# Patient Record
Sex: FEMALE | Race: WHITE | NOT HISPANIC OR LATINO | Employment: FULL TIME | URBAN - METROPOLITAN AREA
[De-identification: names, ages, dates, MRNs, and addresses within clinical notes are randomized per-mention and may not be internally consistent; named-entity substitution may affect disease eponyms.]

---

## 2023-03-13 ENCOUNTER — TELEPHONE (OUTPATIENT)
Dept: PRIMARY CARE | Facility: CLINIC | Age: 43
End: 2023-03-13
Payer: COMMERCIAL

## 2023-03-13 DIAGNOSIS — R21 RASH: Primary | ICD-10-CM

## 2023-03-14 RX ORDER — METHYLPREDNISOLONE 4 MG/1
TABLET ORAL
Qty: 21 TABLET | Refills: 0 | Status: SHIPPED | OUTPATIENT
Start: 2023-03-14 | End: 2023-03-21

## 2023-03-22 ENCOUNTER — TELEPHONE (OUTPATIENT)
Dept: PRIMARY CARE | Facility: CLINIC | Age: 43
End: 2023-03-22
Payer: COMMERCIAL

## 2023-03-23 PROBLEM — M54.9 BACK PAIN: Status: ACTIVE | Noted: 2023-03-23

## 2023-03-23 PROBLEM — E03.9 HYPOTHYROIDISM: Status: ACTIVE | Noted: 2023-03-23

## 2023-03-23 PROBLEM — L71.9 ROSACEA: Status: ACTIVE | Noted: 2023-03-23

## 2023-03-23 PROBLEM — K21.9 GERD (GASTROESOPHAGEAL REFLUX DISEASE): Status: ACTIVE | Noted: 2023-03-23

## 2023-03-23 PROBLEM — M22.40 CHONDROMALACIA OF PATELLA: Status: ACTIVE | Noted: 2023-03-23

## 2023-03-23 PROBLEM — F33.42 RECURRENT MAJOR DEPRESSIVE DISORDER, IN FULL REMISSION (CMS-HCC): Status: ACTIVE | Noted: 2023-03-23

## 2023-03-23 PROBLEM — U07.1 COVID-19 VIRUS INFECTION: Status: ACTIVE | Noted: 2023-03-23

## 2023-03-23 PROBLEM — F41.1 GAD (GENERALIZED ANXIETY DISORDER): Status: ACTIVE | Noted: 2023-03-23

## 2023-03-23 RX ORDER — VIT C/E/ZN/COPPR/LUTEIN/ZEAXAN 250MG-90MG
CAPSULE ORAL
COMMUNITY

## 2023-03-23 RX ORDER — LEVOTHYROXINE SODIUM 75 UG/1
37.5 TABLET ORAL DAILY
COMMUNITY
Start: 2015-10-13 | End: 2023-06-08

## 2023-03-23 RX ORDER — LEVONORGESTREL 52 MG/1
INTRAUTERINE DEVICE INTRAUTERINE
COMMUNITY
End: 2024-04-27 | Stop reason: ALTCHOICE

## 2023-03-23 RX ORDER — SERTRALINE HYDROCHLORIDE 150 MG/1
150 CAPSULE ORAL DAILY
COMMUNITY
Start: 2020-03-31 | End: 2023-06-26 | Stop reason: DRUGHIGH

## 2023-03-23 RX ORDER — MULTIVITAMIN
TABLET ORAL
COMMUNITY

## 2023-03-24 ENCOUNTER — OFFICE VISIT (OUTPATIENT)
Dept: PRIMARY CARE | Facility: CLINIC | Age: 43
End: 2023-03-24
Payer: COMMERCIAL

## 2023-03-24 VITALS
WEIGHT: 216.6 LBS | SYSTOLIC BLOOD PRESSURE: 102 MMHG | HEART RATE: 72 BPM | BODY MASS INDEX: 32.93 KG/M2 | DIASTOLIC BLOOD PRESSURE: 60 MMHG | OXYGEN SATURATION: 99 %

## 2023-03-24 DIAGNOSIS — E03.9 HYPOTHYROIDISM, UNSPECIFIED TYPE: ICD-10-CM

## 2023-03-24 DIAGNOSIS — Z13.29 THYROID DISORDER SCREENING: ICD-10-CM

## 2023-03-24 DIAGNOSIS — R21 ERYTHEMATOUS RASH: Primary | ICD-10-CM

## 2023-03-24 DIAGNOSIS — L71.9 ACNE ROSACEA: ICD-10-CM

## 2023-03-24 PROCEDURE — 99213 OFFICE O/P EST LOW 20 MIN: CPT | Performed by: INTERNAL MEDICINE

## 2023-03-24 PROCEDURE — 1036F TOBACCO NON-USER: CPT | Performed by: INTERNAL MEDICINE

## 2023-03-24 RX ORDER — HYDROXYZINE HYDROCHLORIDE 10 MG/1
10 TABLET, FILM COATED ORAL 4 TIMES DAILY
Qty: 30 TABLET | Refills: 0 | Status: SHIPPED | OUTPATIENT
Start: 2023-03-24 | End: 2023-06-26 | Stop reason: ALTCHOICE

## 2023-03-24 ASSESSMENT — ENCOUNTER SYMPTOMS
FATIGUE: 0
ROS SKIN COMMENTS: NO PHOTOSENSITIVITY
FEVER: 0

## 2023-03-24 ASSESSMENT — PATIENT HEALTH QUESTIONNAIRE - PHQ9
SUM OF ALL RESPONSES TO PHQ9 QUESTIONS 1 AND 2: 0
2. FEELING DOWN, DEPRESSED OR HOPELESS: NOT AT ALL
1. LITTLE INTEREST OR PLEASURE IN DOING THINGS: NOT AT ALL

## 2023-03-24 NOTE — PROGRESS NOTES
Patient ID: Iman Wooten is a 42 y.o. female who presents for Rash (Around her neck since February, steroids did not help).    /60   Pulse 72   Wt 98.2 kg (216 lb 9.6 oz)   SpO2 99%   BMI 32.93 kg/m²     Rash  This is a chronic problem. Episode onset: more than 2wks. The problem has been waxing and waning since onset. The affected locations include the neck and chest (upper part of the chest). The rash is characterized by itchiness and redness. It is unknown if there was an exposure to a precipitant. Pertinent negatives include no facial edema, fatigue, fever or joint pain. Past treatments include oral steroids. The treatment provided mild relief. There is no history of allergies, asthma or eczema.       Subjective     Review of Systems   Constitutional:  Negative for fatigue and fever.   Musculoskeletal:  Negative for joint pain.   Skin:  Positive for rash.        No photosensitivity       Objective     Physical Exam  Vitals and nursing note reviewed.   Constitutional:       Appearance: Normal appearance.   Cardiovascular:      Rate and Rhythm: Normal rate and regular rhythm.      Pulses: Normal pulses.      Heart sounds: Normal heart sounds.   Pulmonary:      Effort: Pulmonary effort is normal.      Breath sounds: Normal breath sounds.   Skin:     Findings: Erythema and rash present.      Comments: Acneiform eruption    Neurological:      Mental Status: She is alert.         Lab Results   Component Value Date    WBC 6.0 05/28/2022    HGB 15.0 05/28/2022    HCT 44.6 05/28/2022    MCV 93 05/28/2022     05/28/2022           Problem List Items Addressed This Visit          Endocrine/Metabolic    Hypothyroidism     Other Visit Diagnoses       Erythematous rash    -  Primary    Relevant Medications    hydrOXYzine HCL (Atarax) 10 mg tablet    Other Relevant Orders    CBC and Auto Differential    KRISTEN    Sedimentation Rate    C-reactive protein    Tsh With Reflex To Free T4 If Abnormal    Thyroid  Peroxidase (TPO) Antibody    Thyroid disorder screening        Relevant Orders    Tsh With Reflex To Free T4 If Abnormal    Acne rosacea                  Erythematous acneform eruption on the neck and upper part of the chest  No photosensitivity no known inciting factors or triggers  Appears to be erythematous acneform eruption  Etiology unclear  We will do CBCs, sed rate, CRP, KRISTEN,  Hydroxyzine 10 mg 1 pill 4 times a day as needed for itching and rash  If not better follow-up with the PCP

## 2023-05-27 LAB
ALANINE AMINOTRANSFERASE (SGPT) (U/L) IN SER/PLAS: 14 U/L (ref 7–45)
ALBUMIN (G/DL) IN SER/PLAS: 4.5 G/DL (ref 3.4–5)
ALKALINE PHOSPHATASE (U/L) IN SER/PLAS: 43 U/L (ref 33–110)
ANION GAP IN SER/PLAS: 13 MMOL/L (ref 10–20)
ASPARTATE AMINOTRANSFERASE (SGOT) (U/L) IN SER/PLAS: 17 U/L (ref 9–39)
BILIRUBIN TOTAL (MG/DL) IN SER/PLAS: 0.9 MG/DL (ref 0–1.2)
CALCIDIOL (25 OH VITAMIN D3) (NG/ML) IN SER/PLAS: 31 NG/ML
CALCIUM (MG/DL) IN SER/PLAS: 10 MG/DL (ref 8.6–10.6)
CARBON DIOXIDE, TOTAL (MMOL/L) IN SER/PLAS: 25 MMOL/L (ref 21–32)
CHLORIDE (MMOL/L) IN SER/PLAS: 107 MMOL/L (ref 98–107)
CHOLESTEROL (MG/DL) IN SER/PLAS: 181 MG/DL (ref 0–199)
CHOLESTEROL IN HDL (MG/DL) IN SER/PLAS: 63.9 MG/DL
CHOLESTEROL/HDL RATIO: 2.8
CREATININE (MG/DL) IN SER/PLAS: 0.79 MG/DL (ref 0.5–1.05)
ERYTHROCYTE DISTRIBUTION WIDTH (RATIO) BY AUTOMATED COUNT: 11.5 % (ref 11.5–14.5)
ERYTHROCYTE MEAN CORPUSCULAR HEMOGLOBIN CONCENTRATION (G/DL) BY AUTOMATED: 32 G/DL (ref 32–36)
ERYTHROCYTE MEAN CORPUSCULAR VOLUME (FL) BY AUTOMATED COUNT: 96 FL (ref 80–100)
ERYTHROCYTES (10*6/UL) IN BLOOD BY AUTOMATED COUNT: 4.9 X10E12/L (ref 4–5.2)
GFR FEMALE: >90 ML/MIN/1.73M2
GLUCOSE (MG/DL) IN SER/PLAS: 85 MG/DL (ref 74–99)
HEMATOCRIT (%) IN BLOOD BY AUTOMATED COUNT: 46.9 % (ref 36–46)
HEMOGLOBIN (G/DL) IN BLOOD: 15 G/DL (ref 12–16)
LDL: 98 MG/DL (ref 0–99)
LEUKOCYTES (10*3/UL) IN BLOOD BY AUTOMATED COUNT: 6.6 X10E9/L (ref 4.4–11.3)
NRBC (PER 100 WBCS) BY AUTOMATED COUNT: 0 /100 WBC (ref 0–0)
PLATELETS (10*3/UL) IN BLOOD AUTOMATED COUNT: 334 X10E9/L (ref 150–450)
POTASSIUM (MMOL/L) IN SER/PLAS: 4.4 MMOL/L (ref 3.5–5.3)
PROTEIN TOTAL: 7.2 G/DL (ref 6.4–8.2)
SODIUM (MMOL/L) IN SER/PLAS: 141 MMOL/L (ref 136–145)
THYROTROPIN (MIU/L) IN SER/PLAS BY DETECTION LIMIT <= 0.05 MIU/L: 2.24 MIU/L (ref 0.44–3.98)
TRIGLYCERIDE (MG/DL) IN SER/PLAS: 98 MG/DL (ref 0–149)
UREA NITROGEN (MG/DL) IN SER/PLAS: 11 MG/DL (ref 6–23)
VLDL: 20 MG/DL (ref 0–40)

## 2023-06-02 ENCOUNTER — APPOINTMENT (OUTPATIENT)
Dept: PRIMARY CARE | Facility: CLINIC | Age: 43
End: 2023-06-02
Payer: COMMERCIAL

## 2023-06-08 DIAGNOSIS — E03.9 HYPOTHYROIDISM, UNSPECIFIED TYPE: ICD-10-CM

## 2023-06-08 RX ORDER — LEVOTHYROXINE SODIUM 75 UG/1
37.5 TABLET ORAL DAILY
Qty: 15 TABLET | Refills: 0 | Status: SHIPPED | OUTPATIENT
Start: 2023-06-08 | End: 2023-06-26 | Stop reason: SDUPTHER

## 2023-06-26 ENCOUNTER — OFFICE VISIT (OUTPATIENT)
Dept: PRIMARY CARE | Facility: CLINIC | Age: 43
End: 2023-06-26
Payer: COMMERCIAL

## 2023-06-26 VITALS
DIASTOLIC BLOOD PRESSURE: 71 MMHG | SYSTOLIC BLOOD PRESSURE: 107 MMHG | HEIGHT: 68 IN | BODY MASS INDEX: 32.89 KG/M2 | HEART RATE: 69 BPM | WEIGHT: 217 LBS | OXYGEN SATURATION: 97 %

## 2023-06-26 DIAGNOSIS — E03.9 HYPOTHYROIDISM, UNSPECIFIED TYPE: ICD-10-CM

## 2023-06-26 DIAGNOSIS — F33.42 RECURRENT MAJOR DEPRESSIVE DISORDER, IN FULL REMISSION (CMS-HCC): ICD-10-CM

## 2023-06-26 DIAGNOSIS — Z00.00 ANNUAL PHYSICAL EXAM: Primary | ICD-10-CM

## 2023-06-26 DIAGNOSIS — F41.1 GAD (GENERALIZED ANXIETY DISORDER): ICD-10-CM

## 2023-06-26 PROBLEM — U07.1 COVID-19 VIRUS INFECTION: Status: RESOLVED | Noted: 2023-03-23 | Resolved: 2023-06-26

## 2023-06-26 PROCEDURE — 1036F TOBACCO NON-USER: CPT | Performed by: FAMILY MEDICINE

## 2023-06-26 PROCEDURE — 99396 PREV VISIT EST AGE 40-64: CPT | Performed by: FAMILY MEDICINE

## 2023-06-26 RX ORDER — LEVOTHYROXINE SODIUM 75 UG/1
37.5 TABLET ORAL
Qty: 45 TABLET | Refills: 3 | Status: SHIPPED | OUTPATIENT
Start: 2023-06-26 | End: 2024-06-25

## 2023-06-26 RX ORDER — SERTRALINE HYDROCHLORIDE 100 MG/1
150 TABLET, FILM COATED ORAL DAILY
Qty: 135 TABLET | Refills: 3 | Status: SHIPPED | OUTPATIENT
Start: 2023-06-26 | End: 2023-12-23 | Stop reason: SDUPTHER

## 2023-06-26 RX ORDER — ESTRADIOL 1 MG/1
1 TABLET ORAL DAILY
COMMUNITY
Start: 2023-05-19 | End: 2024-05-22

## 2023-06-26 ASSESSMENT — PATIENT HEALTH QUESTIONNAIRE - PHQ9
2. FEELING DOWN, DEPRESSED OR HOPELESS: NOT AT ALL
1. LITTLE INTEREST OR PLEASURE IN DOING THINGS: NOT AT ALL
SUM OF ALL RESPONSES TO PHQ9 QUESTIONS 1 AND 2: 0

## 2023-06-26 ASSESSMENT — COLUMBIA-SUICIDE SEVERITY RATING SCALE - C-SSRS
6. HAVE YOU EVER DONE ANYTHING, STARTED TO DO ANYTHING, OR PREPARED TO DO ANYTHING TO END YOUR LIFE?: NO
2. HAVE YOU ACTUALLY HAD ANY THOUGHTS OF KILLING YOURSELF?: NO
1. IN THE PAST MONTH, HAVE YOU WISHED YOU WERE DEAD OR WISHED YOU COULD GO TO SLEEP AND NOT WAKE UP?: NO

## 2023-06-26 ASSESSMENT — ENCOUNTER SYMPTOMS
LOSS OF SENSATION IN FEET: 0
OCCASIONAL FEELINGS OF UNSTEADINESS: 0
DEPRESSION: 0

## 2023-06-26 NOTE — PROGRESS NOTES
"Subjective   Patient ID: Iman Wooten is a 42 y.o. female who presents for Annual Exam.    Last Annual Physical: June 2022  Last Dental Visit: Up-to-date  Last Eye exam: Within the year  Hearing Concerns: No  Diet: Well- balanced  Exercise Routine: Regular exercise      HPI   The patient reports that she is taking levothyroxine for her hypothyroidism, Sertraline for her anxiety and she has a Mirena IUD and is taking Estradiol for her menopausal symptoms. She is taking these medications as prescribed and is tolerating them well. She complains of consistent itching in her neck area and adds that the hydroxyzine did not help her symptoms.     Review of Systems  Constitutional: No fever or chills, No Night Sweats  Eyes: No Blurry Vision or Eye sight problems  ENT: No Nasal Discharge, Hoarseness, sore throat  Cardiovascular: no chest pain, no palpitations and no syncope.   Respiratory: no cough, no shortness of breath during exertion and no shortness of breath at rest.   Gastrointestinal: no abdominal pain, no nausea and no vomiting.   : No vaginal discharge, burning with urination, no blood in urine or stools  Skin: No Skin rashes or Lesions  Neuro: No Headache, no dizziness or Numbness or tingling  Psych: No Anxiety, depression or sleeping problems  Heme: No Easy bleeding or brusing.     Objective   /71   Pulse 69   Ht 1.727 m (5' 8\")   Wt 98.4 kg (217 lb)   SpO2 97%   BMI 32.99 kg/m²     Physical Exam  Patient declined Chaperone  Constitutional: Alert and in no acute distress. Well developed, well nourished.   Head and Face: Head and face: Normal.    Eyes: Normal external exam. Pupils were equal in size, round, reactive to light (PERRL) with normal accommodation and extraocular movements intact (EOMI).   Ears, Nose, Mouth, and Throat: External inspection of ears and nose: Normal.  Hearing: Normal.  Nasal mucosa, septum, and turbinates: Normal.  Lips, teeth, and gums: Normal.  Oropharynx: Normal. "   Neck: No neck mass was observed. Supple. Thyroid not enlarged and there were no palpable thyroid nodules.   Cardiovascular: Heart rate and rhythm were normal, normal S1 and S2. Pedal pulses: Normal. No peripheral edema.   Pulmonary: No respiratory distress. Clear bilateral breath sounds.   Abdomen: Soft nontender; no abdominal mass palpated. Normal bowel sounds. No organomegaly.   Musculoskeletal: No joint swelling seen, normal movements of all extremities. Range of motion: Normal.  Muscle strength/tone: Normal.    Skin: Normal skin color and pigmentation, normal skin turgor, and no rash.   Neurologic: Deep tendon reflexes were 2+ and symmetric.   Psychiatric: Judgment and insight: Intact. Mood and affect: Normal.  Lymphatic: No cervical lymphadenopathy. Palpation of lymph nodes in axillae: Normal.  Palpation of lymph nodes in groin: Normal.    Lab Results   Component Value Date    WBC 6.6 05/27/2023    HGB 15.0 05/27/2023    HCT 46.9 (H) 05/27/2023     05/27/2023    CHOL 181 05/27/2023    TRIG 98 05/27/2023    HDL 63.9 05/27/2023    ALT 14 05/27/2023    AST 17 05/27/2023     05/27/2023    K 4.4 05/27/2023     05/27/2023    CREATININE 0.79 05/27/2023    BUN 11 05/27/2023    CO2 25 05/27/2023    TSH 2.24 05/27/2023           Assessment/Plan   Diagnoses and all orders for this visit:  Annual physical exam  Hypothyroidism, unspecified type  -     levothyroxine (Synthroid, Levoxyl) 75 mcg tablet; Take 0.5 tablets (37.5 mcg) by mouth once daily in the morning. Take before meals.  HERO (generalized anxiety disorder)  -     sertraline (Zoloft) 100 mg tablet; Take 1.5 tablets (150 mg) by mouth once daily.  Recurrent major depressive disorder, in full remission (CMS/[facility])        Dear Iman Wooten     It was my pleasure to take care of you today in the office. Below are the things we discussed today:    1. 1. Immunizations: Yearly Flu shot is recommended. Up-to-date         a: COVID: Up-to-Date          b: Tetanus: Up-to-date    2. Blood Work: Reviewed   3. Seen your dentist twice a year  4. Yearly Eye exam is recommended    5. BMI: Obese   6: Diet recommendations:   Eat Clean, Try to have as many home cooked meals as possible  Avoid processed foods which contain excess calories, sugar, and sodium.    7. Exercise recommendations:   150 minutes a week to maintain your weight     If you have to loose weight, you need a better diet and exercise plan.     8. Supplements recommended:  a - Calcium 600 mg up to twice a day to get a total of 1200 mg. Each 8 oz of milk or yogurt or 1 oz of cheese, 1 Banana, 1 serving of green Leafy vegetable has about 300 mg of Calcium, so you may subtract that amount. Calcium citrate is the only acceptable supplement to take if you take an acid suppressing medication like Prilosec; otherwise Calcium carbonate is acceptable too (It can cause Constipation).   b - Vitamin D - 2000 IU daily     9. Please get your Living will / Advance directive completed if you do not have one already. Please make sure our office has a copy of the latest one.     10. Mammogram: Uptodate. Patient is following up with GYN at the clinic   12. PAP: Last done in Dec 2020. Cytology and HPV up-to-date    13. Hypothyroidism: Continue levothyroxine.     14. Anxiety: Continue Sertraline.     15. Menopausal symptoms: The patient has a Mirena IUD and is taking estradiol.     16. Itching: Advised the patient to use cold CeraVe for itching.     17. Weight loss: Advised the patient to work on her diet and exercise and let me know when she would like to start tapering off the Zoloft.     Follow up in one year for a Physical. Please call the office before your Physical to see if you need blood work completed prior to your physical.     Please call me if any questions arise from now until your next visit. I will call you after I am done seeing patients. A Doctor is always available by phone when the office is closed. Please  feel free to call for help with any problem that you feel shouldn't wait until the office re-opens.     Scribe Attestation  By signing my name below, I, Bal Vigil   attest that this documentation has been prepared under the direction and in the presence of Dahlia Ott MD.

## 2023-12-23 DIAGNOSIS — F41.1 GAD (GENERALIZED ANXIETY DISORDER): ICD-10-CM

## 2023-12-23 RX ORDER — SERTRALINE HYDROCHLORIDE 100 MG/1
150 TABLET, FILM COATED ORAL DAILY
Qty: 45 TABLET | Refills: 0 | Status: SHIPPED | OUTPATIENT
Start: 2023-12-23 | End: 2024-01-18

## 2024-01-02 ENCOUNTER — TELEPHONE (OUTPATIENT)
Dept: PRIMARY CARE | Facility: CLINIC | Age: 44
End: 2024-01-02
Payer: COMMERCIAL

## 2024-01-02 DIAGNOSIS — U07.1 COVID-19 VIRUS INFECTION: Primary | ICD-10-CM

## 2024-01-17 DIAGNOSIS — F41.1 GAD (GENERALIZED ANXIETY DISORDER): ICD-10-CM

## 2024-01-18 RX ORDER — SERTRALINE HYDROCHLORIDE 100 MG/1
150 TABLET, FILM COATED ORAL DAILY
Qty: 135 TABLET | Refills: 0 | Status: SHIPPED | OUTPATIENT
Start: 2024-01-18 | End: 2024-03-19

## 2024-03-18 DIAGNOSIS — F41.1 GAD (GENERALIZED ANXIETY DISORDER): ICD-10-CM

## 2024-03-19 RX ORDER — SERTRALINE HYDROCHLORIDE 100 MG/1
150 TABLET, FILM COATED ORAL DAILY
Qty: 135 TABLET | Refills: 0 | Status: SHIPPED | OUTPATIENT
Start: 2024-03-19 | End: 2024-05-08

## 2024-04-10 ENCOUNTER — TELEPHONE (OUTPATIENT)
Dept: PRIMARY CARE | Facility: CLINIC | Age: 44
End: 2024-04-10
Payer: COMMERCIAL

## 2024-04-10 DIAGNOSIS — Z00.00 ROUTINE GENERAL MEDICAL EXAMINATION AT A HEALTH CARE FACILITY: Primary | ICD-10-CM

## 2024-04-10 DIAGNOSIS — E55.9 VITAMIN D DEFICIENCY: ICD-10-CM

## 2024-04-27 DIAGNOSIS — N95.1 MENOPAUSAL SYNDROME ON HORMONE REPLACEMENT THERAPY: Primary | ICD-10-CM

## 2024-04-27 DIAGNOSIS — Z79.890 MENOPAUSAL SYNDROME ON HORMONE REPLACEMENT THERAPY: Primary | ICD-10-CM

## 2024-04-27 RX ORDER — PROGESTERONE 200 MG/1
CAPSULE ORAL
Qty: 14 CAPSULE | Refills: 11 | Status: SHIPPED | OUTPATIENT
Start: 2024-04-27

## 2024-04-27 NOTE — PROGRESS NOTES
Pt accidentally pulled IUD out; will use condoms; MP prescribed, she would like to continue with the estrogen

## 2024-05-08 DIAGNOSIS — F41.1 GAD (GENERALIZED ANXIETY DISORDER): ICD-10-CM

## 2024-05-08 RX ORDER — SERTRALINE HYDROCHLORIDE 100 MG/1
150 TABLET, FILM COATED ORAL DAILY
Qty: 135 TABLET | Refills: 0 | Status: SHIPPED | OUTPATIENT
Start: 2024-05-08

## 2024-06-27 ENCOUNTER — APPOINTMENT (OUTPATIENT)
Dept: PRIMARY CARE | Facility: CLINIC | Age: 44
End: 2024-06-27
Payer: COMMERCIAL

## 2024-06-29 DIAGNOSIS — E03.9 HYPOTHYROIDISM, UNSPECIFIED TYPE: ICD-10-CM

## 2024-06-30 RX ORDER — LEVOTHYROXINE SODIUM 75 UG/1
37.5 TABLET ORAL EVERY MORNING
Qty: 45 TABLET | Refills: 0 | Status: SHIPPED | OUTPATIENT
Start: 2024-06-30

## 2024-07-22 ENCOUNTER — APPOINTMENT (OUTPATIENT)
Dept: OBSTETRICS AND GYNECOLOGY | Facility: CLINIC | Age: 44
End: 2024-07-22
Payer: COMMERCIAL

## 2024-07-22 VITALS
BODY MASS INDEX: 34.92 KG/M2 | HEIGHT: 68 IN | SYSTOLIC BLOOD PRESSURE: 126 MMHG | WEIGHT: 230.4 LBS | DIASTOLIC BLOOD PRESSURE: 68 MMHG

## 2024-07-22 DIAGNOSIS — R63.4 WEIGHT LOSS: ICD-10-CM

## 2024-07-22 DIAGNOSIS — Z12.4 CERVICAL CANCER SCREENING: ICD-10-CM

## 2024-07-22 DIAGNOSIS — Z01.419 WELL WOMAN EXAM WITH ROUTINE GYNECOLOGICAL EXAM: Primary | ICD-10-CM

## 2024-07-22 PROCEDURE — 3008F BODY MASS INDEX DOCD: CPT | Performed by: NURSE PRACTITIONER

## 2024-07-22 PROCEDURE — 99396 PREV VISIT EST AGE 40-64: CPT | Performed by: NURSE PRACTITIONER

## 2024-07-22 PROCEDURE — 88175 CYTOPATH C/V AUTO FLUID REDO: CPT

## 2024-07-22 PROCEDURE — 87624 HPV HI-RISK TYP POOLED RSLT: CPT

## 2024-07-22 PROCEDURE — 1036F TOBACCO NON-USER: CPT | Performed by: NURSE PRACTITIONER

## 2024-07-22 ASSESSMENT — ENCOUNTER SYMPTOMS
CARDIOVASCULAR NEGATIVE: 0
ENDOCRINE NEGATIVE: 0
EYES NEGATIVE: 0
MUSCULOSKELETAL NEGATIVE: 0
PSYCHIATRIC NEGATIVE: 0
ALLERGIC/IMMUNOLOGIC NEGATIVE: 0
CONSTITUTIONAL NEGATIVE: 0
NEUROLOGICAL NEGATIVE: 0
GASTROINTESTINAL NEGATIVE: 0
HEMATOLOGIC/LYMPHATIC NEGATIVE: 0
RESPIRATORY NEGATIVE: 0

## 2024-07-22 ASSESSMENT — PAIN SCALES - GENERAL: PAINLEVEL: 0-NO PAIN

## 2024-07-22 NOTE — PROGRESS NOTES
Subjective   Patient ID: Iman Wooten is a 43 y.o. female who presents for Annual Exam (Pap 2015).  HPI  working with a dietician for weight gain  Previously worked with a provider at the  weight loss clinic, but did not care for it  weight gain despite no change in diet or exercise    Menopausal age: perimenopause      Any Contraindications to HT: personal h/o breast cancer, estrogen sensitive cancer, dementia, stroke, MI, VTE or inherited high risk for VTE, SCAD: none  h/o congenital heart disease (increased risk of DVT) none     contraception: several months ago accidentally pulled out IUD; MP 200mg 14 days/month  Menses: heavy VB for 4 days; monthly   HT: started 2023  use of OTC remedies: none  bioidenticals: none       VMS: yes  Sleep difficulties: occasional   Mood changes: none  Joint pain: mild  Brain fog/difficulty concentrating: yes  Weight gain      GSM: none  are you sexually active: yes  dyspareunia: none  decrease in desire: none  difficulty reaching orgasm: none  Urinary Incontinence: yes, stress, worked with a PFPT       Fractures: none  H/O abnormal pap: none        FH:   breast cancer: maternal grandmother, maternal cousin  ovarian cancer: none  colon cancer: none  pancreatic cancer: none     Social history:  lives with:  and daughter   occupation: works at [employer]  Exercise: yes, jazzercise              Review of Systems    Objective   Physical Exam  Vitals and nursing note reviewed.   Constitutional:       Appearance: Normal appearance.   Cardiovascular:      Rate and Rhythm: Normal rate and regular rhythm.      Heart sounds: Normal heart sounds.   Pulmonary:      Effort: Pulmonary effort is normal.      Breath sounds: Normal breath sounds.   Chest:   Breasts:     Right: Normal.      Left: Normal.   Abdominal:      Tenderness: There is no abdominal tenderness.   Genitourinary:     General: Normal vulva.      Exam position: Lithotomy position.      Labia:         Right: No  lesion.         Left: No lesion.       Vagina: Normal.      Cervix: Normal.   Skin:     General: Skin is warm and dry.   Neurological:      Mental Status: She is alert.   Psychiatric:         Attention and Perception: Attention normal.         Mood and Affect: Mood normal.         Speech: Speech normal.         Behavior: Behavior normal.         Thought Content: Thought content normal.         Cognition and Memory: Cognition and memory normal.         Judgment: Judgment normal.         Assessment/Plan   Diagnoses and all orders for this visit:  Well woman exam with routine gynecological exam  Cervical cancer screening  -     THINPREP PAP TEST  Weight loss  -     Referral to Clinical Pharmacy; Future       Pt plans to return for IUD placement  Samples of CEE .625mg po, lot [id number], 1/2025  If ineffective will switch to transdermal estradiol    PREMA Marinelli-CNP 07/22/24 10:57 AM

## 2024-07-29 NOTE — PROGRESS NOTES
"  Patient ID: Iman Wooten is a 43 y.o. female who presents for No chief complaint on file..    Referring Provider: Francisco Javier Cope  Pt was referred for weight loss     Preferred Pharmacy:    [id number] - [facility], [place] - [address]  [address]  [facility] [place] [address]  Phone: [phone] Fax: [phone]    [facility] - [place], [place] - [address]  [address] [address]  Phone: [phone] Fax: [phone]    [id number] - [place], [place] - [address]  [address] [address]  Phone: [phone] Fax: [phone]      Subjective    Accidentally removed IUD a \"couple of months ago\" - started progesterone while out, getting a new one placed tomorrow.     While IUD was in, no bleeding.   When IUD came out, returned to having a cycle following 14 days of progesterone.    -Heavy with clots, cramps    Any plans for a pregnancy in the next year: No    Uterus: Yes    Movement:   Joint pain? No- has been on estradiol x ~1 year  Strength training? Jazzercise - weights component to each hour long class, 40% cardio and 20% free weights.   Recommended listening to podcast with Alina White  Recommend 2-3x/week for 20 minutes  Osteopenia or osteoporosis: No    Stress Level (rate 1-10): 7-8 out of 10   9 yo daughter with Autism   Exercise is a stress reliever  Started with [facility] therapist - daughter + weight & body image  Recommend daily 2-5 minutes of mindfulness, meditation, journaling, etc to help reduce cortisol levels.     Depression? Yes, life long on sertraline    Sleep:   Quantity/Quality/Regularity? \"Not great\" - 5-6 hours nightly, wears fit bit sleep quality is \"fair\".   Daytime brain fog/memory troubles? Yes- estradiol helped, but still noticeable.   Tobacco? No  Alcohol? Yes. 1 glass of wine every couple day  Recommended reducing alcohol consumption  THC? No    Patient identified goals:       Onset:   3-4 years ago " "(COVID work from home)  Nothing else had changed, eating/moving the same, exercising more.   Overall weight gain about 50 lbs  Noticing ankles hurting more during jazzercise. 3-4 days/week in studio and 1-2 mornings at home.     Current challenges:   Mental - has been on and off diets entire life  Most consistent weight was 180  Adverse to tracking  Time  Feeling short on space for meal prep/planning    How long implementing diet/lifestyle change for weight loss:   Lifelong    Disordered eating patterns:   None    Weight Loss Drug Therapy Options Screening:     GLP-1 and Metformin:     Pre-Diabetes/Diabetes? No  No results found for: \"INSULFAST\", \"GLUF\", \"ZX8DELIO\", \"HGBA1C\", \"LEPTIN\"    Pertinent PMH Review:   PMH of Pancreatitis: No  PMH of Gallbladder disease: No  PMH of Delayed Gastric Emptying: No  GI issues? GERD  Frequency of BM? Daily  PMH of MTC: No  PMH of Retinopathy: No  PMH of Suicidal Ideation: No    Topiramate:   Migraines? No    Adipex:   Anxiety? Yes    Thyroid health:   Lab Results   Component Value Date    TSH 2.24 05/27/2023        HRT  -Estradiol 1mg oral and IUD    Medications potentially contributing to weight gain:   None    Medications tried/stopped for weight management:    None     Concurrent Chronic Medical Conditions:   Cardiovascular Health  The 10-year ASCVD risk score (David PAYAN, et al., 2019) is: 0.5%    Values used to calculate the score:      Age: 43 years      Sex: Female      Is Non- : No      Diabetic: No      Tobacco smoker: No      Systolic Blood Pressure: 126 mmHg      Is BP treated: No      HDL Cholesterol: 63.9 mg/dL      Total Cholesterol: 181 mg/dL    Lab Results   Component Value Date    CHOL 181 05/27/2023     Lab Results   Component Value Date    HDL 63.9 05/27/2023     No results found for: \"LDLCALC\"  Lab Results   Component Value Date    TRIG 98 05/27/2023     No components found for: \"CHOLHDL\"   BP Readings from Last 3 Encounters:   07/22/24 " "126/68   06/26/23 107/71   06/16/23 117/80      Blood Sugar Balance  Lab Results   Component Value Date    GLUCOSE 85 05/27/2023     No results found for: \"LEPTIN\", \"INSULFAST\", \"GLUF\"      Thyroid  Lab Results   Component Value Date    TSH 2.24 05/27/2023     Iron Status  No results found for: \"IRON\", \"TIBC\", \"FERRITIN\"     Kidney Function  Lab Results   Component Value Date    GFRF >90 05/27/2023    CREATININE 0.79 05/27/2023       Potassium  Lab Results   Component Value Date    K 4.4 05/27/2023        Vitamin D3  Lab Results   Component Value Date    VITD25 31 05/27/2023   Recommended 2000 international units  daily, with a fat containing meal    Current Outpatient Medications on File Prior to Visit   Medication Sig Dispense Refill    cholecalciferol (Vitamin D-3) 25 MCG (1000 UT) capsule Take by mouth.      estradiol (Estrace) 1 mg tablet Take 1 tablet (1 mg) by mouth once daily. 90 tablet 3    levothyroxine (Synthroid, Levoxyl) 75 mcg tablet Take 0.5 tablets (37.5 mcg) by mouth once daily in the morning. Take on empty stomach 45 tablet 0    multivitamin tablet Take by mouth.      progesterone (Prometrium) 200 mg capsule Take one pill by mouth at bedtime for 14 days/month 14 capsule 11    sertraline (Zoloft) 100 mg tablet Take 1.5 tablets (150 mg) by mouth once daily. 135 tablet 0     No current facility-administered medications on file prior to visit.        Lifestyle and Nourishment Recommendations  Currently 3 meals daily with a mid morning and mid afternoon snack, sometimes after dinner snack.   Dinner at 8pm on gym days  Not gym days: 6:30pm  Bedtime: 9:30-10:30pm  Ideally last food 3 hours prior to bedtime.   Encouraged 1/2 of plate colorful vegetables at each meal  Focus on whole foods as close to nature as possible (less than 5 ingredients on the label)  Increase high quality protein to 25-30 grams per meal along with 2-3 x/week resistance training  Discussed [employer] Meats  Patient had questions " "regarding protein shakes, currently drinking [employer] protein shakes, recommended discontinuation. Will send recommendation for Whey Protein powder.   Include daily walking (150 min/week) and mind/body activities (meditation, mindfulness, journaling, etc.) for a minimum of 2-5 minutes daily to reduce cortisol levels.   Increase fiber 25-30 grams daily (work up slowly to avoid GI distress)  Goal: Daily bowel movement  Beverages: Focus on water, organic tea (loose leave or in paper, avoid plastic sachets), or sparkling/mineral water (ex. [place], Emir)   Currently drinking 2-3 8pz cups daily. Last 3-4pm.   No more than 1 cup (8oz) of organic coffee daily prior to noon. May consider organic green tea.   Avoid alcohol   Avoid ALL artificial sweeteners   Prioritize 7-8 hours of restful sleep nightly.   Turn off electronics 1 hour prior to bedtime, no electronics in the bedroom.  Establish a regular sleep/wake time (+/- 30 minutes)     Medication and allergy reconciliation completed     Drug Interactions   No significant drug interactions identified    Assessment/Plan     Assessment/Plan  Patients goals:   \"To be less than 200 lbs\"   Discussed patients history, current medical conditions, medications, as well as current diet and lifestyle in depth.   Patient has been making diet/lifestyle changes for: >10 years  BMI: 35  Concurrent medical conditions: hypothyroidism.     Reviewed the risks vs. benefits of available medications that may help promote weight loss.   GLP-1 Education  GLP-1 medications work by stimulating insulin release from the pancreas, slowing gastric emptying, inhibiting post meal glucagon release, and reducing appetite.   Discussed benefits of GLP-1 including lowering blood sugar, blood pressure, improving lipid profile, improving fatty liver disease, reducing the risk of heart disease and kidney disease, weight loss, and delaying the progression of diabetes-related nephropathy.   40-50% of weight " loss may come from lean muscle mass. It is very important to continue regular exercise including activities that maintain/build muscle mass while taking this medication.   Side effects could include but are not limited to low blood sugar (hypoglycemia), loss of appetite, nausea, vomiting, diarrhea, and delayed gastric emptying.   Encouraged smaller meals and avoiding high fat meals to minimize nausea.   Discussed treating hypoglycemia with 15 grams of carbohydrates (1/2 glass of juice, piece of fruit, 3-4 glucose tablets)  Rare but serious side effects could include but are not limited to gallbladder disease, pancreatitis, medullary thyroid cancer, acute kidney injury, worsening diabetes related retinopathy, gastroparesis, and bowel obstruction.   If you would experience increased depression or suicidal thoughts while taking this medication contact your PCP immediately.     LABS  Fasting insulin-ordered today  Fasting glucose- already ordered by PCP  A1C- already ordered by PCP  TSH - already ordered by PCP    Follow-up: 8/20/24 9am     Time spent with pt: Total length of time 60 (minutes) of the encounter and more than 50% was spent counseling the patient.    Carmina Elizalde, Pharm.D, [facility], [facility]  Clinical Pharmacist  Pharmacy Services  [phone]    Continue all meds under the continuation of care with the referring provider and clinical pharmacy team.    Verbal consent to manage patient's drug therapy was obtained from the patient and/or an individual authorized to act on behalf of a patient. They were informed they may decline to participate or withdraw from participation in pharmacy services at any time.

## 2024-07-30 ENCOUNTER — APPOINTMENT (OUTPATIENT)
Dept: PHARMACY | Facility: HOSPITAL | Age: 44
End: 2024-07-30
Payer: COMMERCIAL

## 2024-07-30 ENCOUNTER — TELEPHONE (OUTPATIENT)
Dept: OBSTETRICS AND GYNECOLOGY | Facility: CLINIC | Age: 44
End: 2024-07-30

## 2024-07-30 DIAGNOSIS — R63.4 WEIGHT LOSS: ICD-10-CM

## 2024-07-30 NOTE — TELEPHONE ENCOUNTER
Attempted to call pt for instruction from Francisco Javier Cope prior to visit.  Pt did not answer, left VM to return call to clinic.

## 2024-07-30 NOTE — TELEPHONE ENCOUNTER
----- Message from Francisco Javier Cope sent at 7/30/2024  1:55 PM EDT -----  Can you please remind her to not take any pain medicine, including motrin, 4 hours before her scheduled appointment tomorrow? Thank you

## 2024-07-31 ENCOUNTER — TELEPHONE (OUTPATIENT)
Dept: OBSTETRICS AND GYNECOLOGY | Facility: CLINIC | Age: 44
End: 2024-07-31

## 2024-07-31 ENCOUNTER — APPOINTMENT (OUTPATIENT)
Dept: OBSTETRICS AND GYNECOLOGY | Facility: CLINIC | Age: 44
End: 2024-07-31
Payer: COMMERCIAL

## 2024-07-31 VITALS
HEIGHT: 68 IN | WEIGHT: 228.6 LBS | DIASTOLIC BLOOD PRESSURE: 70 MMHG | BODY MASS INDEX: 34.65 KG/M2 | SYSTOLIC BLOOD PRESSURE: 118 MMHG

## 2024-07-31 DIAGNOSIS — Z30.430 ENCOUNTER FOR IUD INSERTION: Primary | ICD-10-CM

## 2024-07-31 DIAGNOSIS — N95.1 MENOPAUSAL SYNDROME ON HORMONE REPLACEMENT THERAPY: ICD-10-CM

## 2024-07-31 DIAGNOSIS — Z79.890 MENOPAUSAL SYNDROME ON HORMONE REPLACEMENT THERAPY: ICD-10-CM

## 2024-07-31 PROCEDURE — 58300 INSERT INTRAUTERINE DEVICE: CPT | Performed by: NURSE PRACTITIONER

## 2024-07-31 RX ORDER — ESTRADIOL 1 MG/1
1 TABLET ORAL DAILY
Qty: 30 TABLET | Refills: 11 | Status: SHIPPED | OUTPATIENT
Start: 2024-07-31

## 2024-07-31 ASSESSMENT — ENCOUNTER SYMPTOMS
NEUROLOGICAL NEGATIVE: 0
ALLERGIC/IMMUNOLOGIC NEGATIVE: 0
CARDIOVASCULAR NEGATIVE: 0
GASTROINTESTINAL NEGATIVE: 0
RESPIRATORY NEGATIVE: 0
ENDOCRINE NEGATIVE: 0
CONSTITUTIONAL NEGATIVE: 0
EYES NEGATIVE: 0
MUSCULOSKELETAL NEGATIVE: 0
PSYCHIATRIC NEGATIVE: 0
HEMATOLOGIC/LYMPHATIC NEGATIVE: 0

## 2024-07-31 ASSESSMENT — PAIN SCALES - GENERAL: PAINLEVEL: 0-NO PAIN

## 2024-07-31 NOTE — TELEPHONE ENCOUNTER
Contacted pt and verified name and .  Pt notified per Francisco Javier Cope not to take any pain meds 4 hours before her appt today.  Patient states understanding and has no questions at this time.

## 2024-07-31 NOTE — PROGRESS NOTES
Patient ID: Iman Wooten is a 43 y.o. female.    IUD Insertion    Performed by: HANNA Marinelli  Authorized by: HANNA Marinelli    Procedure: IUD insertion    Consent obtained by patient, parent, or legal power of  - including discussion of procedure risks and benefits, patient questions answered, and patient education provided: yes    Pregnancy risk: reasonably certain the patient is not pregnant    Date/Time of Insertion:  7/31/2024 2:26 PM  Immediately prior to procedure a time out was called: yes    Pelvic exam performed: yes    Speculum placed in vagina: yes    Cervix cleaned and prepped: yes    Tenaculum/Allis/Ring Forceps applied to cervix: yes    Anesthesia used: no    Uterus sound depth (cm):  8  Cervix manually dilated: no    IUD inserted without complications: yes    OSM: levonorgestrel 21 mcg/24 hr (8 yrs) 52 mg  Strings trimmed to (cm):  4  Patient tolerated procedure well: yes    Inserted with ultrasound guidance: no    Transvaginal sono confirmed fundal placement: no    Intended removal date: 8 years

## 2024-08-02 DIAGNOSIS — Z78.0 MENOPAUSE: ICD-10-CM

## 2024-08-02 RX ORDER — ESTRADIOL 1 MG/1
1 TABLET ORAL DAILY
Qty: 90 TABLET | Refills: 4 | OUTPATIENT
Start: 2024-08-02

## 2024-08-05 ENCOUNTER — PATIENT MESSAGE (OUTPATIENT)
Dept: OBSTETRICS AND GYNECOLOGY | Facility: CLINIC | Age: 44
End: 2024-08-05
Payer: COMMERCIAL

## 2024-08-05 DIAGNOSIS — N95.1 MENOPAUSAL SYNDROME ON HORMONE REPLACEMENT THERAPY: ICD-10-CM

## 2024-08-05 DIAGNOSIS — Z79.890 MENOPAUSAL SYNDROME ON HORMONE REPLACEMENT THERAPY: ICD-10-CM

## 2024-08-05 DIAGNOSIS — F41.1 GAD (GENERALIZED ANXIETY DISORDER): ICD-10-CM

## 2024-08-05 RX ORDER — ESTRADIOL 1 MG/1
1 TABLET ORAL DAILY
Qty: 30 TABLET | Refills: 11 | OUTPATIENT
Start: 2024-08-05

## 2024-08-05 RX ORDER — SERTRALINE HYDROCHLORIDE 100 MG/1
150 TABLET, FILM COATED ORAL DAILY
Qty: 135 TABLET | Refills: 0 | Status: SHIPPED | OUTPATIENT
Start: 2024-08-05

## 2024-08-05 RX ORDER — ESTRADIOL 1 MG/1
1 TABLET ORAL DAILY
Qty: 90 TABLET | Refills: 3 | Status: SHIPPED | OUTPATIENT
Start: 2024-08-05

## 2024-08-06 ENCOUNTER — LAB (OUTPATIENT)
Dept: LAB | Facility: LAB | Age: 44
End: 2024-08-06
Payer: COMMERCIAL

## 2024-08-06 DIAGNOSIS — E55.9 VITAMIN D DEFICIENCY: ICD-10-CM

## 2024-08-06 DIAGNOSIS — R63.4 WEIGHT LOSS: ICD-10-CM

## 2024-08-06 DIAGNOSIS — Z00.00 ROUTINE GENERAL MEDICAL EXAMINATION AT A HEALTH CARE FACILITY: ICD-10-CM

## 2024-08-06 LAB
25(OH)D3 SERPL-MCNC: 39 NG/ML (ref 30–100)
ALBUMIN SERPL BCP-MCNC: 4.3 G/DL (ref 3.4–5)
ALP SERPL-CCNC: 37 U/L (ref 33–110)
ALT SERPL W P-5'-P-CCNC: 11 U/L (ref 7–45)
ANION GAP SERPL CALC-SCNC: 13 MMOL/L (ref 10–20)
AST SERPL W P-5'-P-CCNC: 14 U/L (ref 9–39)
BILIRUB SERPL-MCNC: 1.1 MG/DL (ref 0–1.2)
BUN SERPL-MCNC: 15 MG/DL (ref 6–23)
CALCIUM SERPL-MCNC: 9.9 MG/DL (ref 8.6–10.6)
CHLORIDE SERPL-SCNC: 107 MMOL/L (ref 98–107)
CHOLEST SERPL-MCNC: 174 MG/DL (ref 0–199)
CHOLESTEROL/HDL RATIO: 2.6
CO2 SERPL-SCNC: 24 MMOL/L (ref 21–32)
CREAT SERPL-MCNC: 0.8 MG/DL (ref 0.5–1.05)
EGFRCR SERPLBLD CKD-EPI 2021: >90 ML/MIN/1.73M*2
ERYTHROCYTE [DISTWIDTH] IN BLOOD BY AUTOMATED COUNT: 11.7 % (ref 11.5–14.5)
EST. AVERAGE GLUCOSE BLD GHB EST-MCNC: 103 MG/DL
GLUCOSE SERPL-MCNC: 92 MG/DL (ref 74–99)
HBA1C MFR BLD: 5.2 %
HCT VFR BLD AUTO: 45.7 % (ref 36–46)
HDLC SERPL-MCNC: 67.8 MG/DL
HGB BLD-MCNC: 14.9 G/DL (ref 12–16)
INSULIN P FAST SERPL-ACNC: 15 UIU/ML (ref 3–25)
LDLC SERPL CALC-MCNC: 74 MG/DL
MCH RBC QN AUTO: 30.6 PG (ref 26–34)
MCHC RBC AUTO-ENTMCNC: 32.6 G/DL (ref 32–36)
MCV RBC AUTO: 94 FL (ref 80–100)
NON HDL CHOLESTEROL: 106 MG/DL (ref 0–149)
NRBC BLD-RTO: 0 /100 WBCS (ref 0–0)
PLATELET # BLD AUTO: 298 X10*3/UL (ref 150–450)
POTASSIUM SERPL-SCNC: 4.3 MMOL/L (ref 3.5–5.3)
PROT SERPL-MCNC: 6.9 G/DL (ref 6.4–8.2)
RBC # BLD AUTO: 4.87 X10*6/UL (ref 4–5.2)
SODIUM SERPL-SCNC: 140 MMOL/L (ref 136–145)
TRIGL SERPL-MCNC: 163 MG/DL (ref 0–149)
TSH SERPL-ACNC: 3.63 MIU/L (ref 0.44–3.98)
VIT B12 SERPL-MCNC: 428 PG/ML (ref 211–911)
VLDL: 33 MG/DL (ref 0–40)
WBC # BLD AUTO: 7.6 X10*3/UL (ref 4.4–11.3)

## 2024-08-06 PROCEDURE — 80061 LIPID PANEL: CPT

## 2024-08-06 PROCEDURE — 80053 COMPREHEN METABOLIC PANEL: CPT

## 2024-08-06 PROCEDURE — 36415 COLL VENOUS BLD VENIPUNCTURE: CPT

## 2024-08-06 PROCEDURE — 85027 COMPLETE CBC AUTOMATED: CPT

## 2024-08-06 PROCEDURE — 82607 VITAMIN B-12: CPT

## 2024-08-06 PROCEDURE — 84443 ASSAY THYROID STIM HORMONE: CPT

## 2024-08-06 PROCEDURE — 83036 HEMOGLOBIN GLYCOSYLATED A1C: CPT

## 2024-08-06 PROCEDURE — 83525 ASSAY OF INSULIN: CPT

## 2024-08-06 PROCEDURE — 82306 VITAMIN D 25 HYDROXY: CPT

## 2024-08-19 NOTE — PROGRESS NOTES
"  Patient ID: Iman Wooten is a 43 y.o. female who presents for No chief complaint on file..    Referring Provider: Francisco Javier Cope  Pt was referred for weight loss     Preferred Pharmacy:    [facility]/pharmacy #[id number] - [facility], [place] - [address]  [address]  [facility] [address]  Phone: [phone] Fax: [phone]    [facility] - [place], [place] - [address] [facility]  [address] [facility] [address]  Phone: [phone] Fax: [phone]      Subjective    Accidentally removed IUD a \"couple of months ago\" - started progesterone while out, getting a new one placed tomorrow.     While IUD was in, no bleeding.   When IUD came out, returned to having a cycle following 14 days of progesterone.    -Heavy with clots, cramps    Any plans for a pregnancy in the next year: No    Uterus: Yes    Movement:   Joint pain? No- has been on estradiol x ~1 year  Strength training? Jazzercise - weights component to each hour long class, 40% cardio and 20% free weights.   Recommended listening to podcast with Alina White  Recommend 2-3x/week for 20 minutes  Osteopenia or osteoporosis: No    Stress Level (rate 1-10): 7-8 out of 10   7 yo daughter with Autism   Exercise is a stress reliever  Started with [facility] - daughter + weight & body image  Recommend daily 2-5 minutes of mindfulness, meditation, journaling, etc to help reduce cortisol levels.     Depression? Yes, life long on sertraline    Sleep:   Quantity/Quality/Regularity? \"Not great\" - 5-6 hours nightly, wears fit bit sleep quality is \"fair\".   Daytime brain fog/memory troubles? Yes- estradiol helped, but still noticeable.   Tobacco? No  Alcohol? Yes. 1 glass of wine every couple day  Recommended reducing alcohol consumption  THC? No    Patient identified goals:       Onset:   3-4 years ago (COVID work from home)  Nothing else had changed, eating/moving the same, exercising more.   Overall weight gain about 50 " "lbs  Noticing ankles hurting more during jazzercise. 3-4 days/week in studio and 1-2 mornings at home.     Current challenges:   Mental - has been on and off diets entire life  Most consistent weight was 180  Adverse to tracking  Time  Feeling short on space for meal prep/planning    How long implementing diet/lifestyle change for weight loss:   Lifelong    Disordered eating patterns:   None    Weight Loss Drug Therapy Options Screening:     GLP-1 and Metformin:     Pre-Diabetes/Diabetes? No  Lab Results   Component Value Date    INSULFAST 15 08/06/2024    HGBA1C 5.2 08/06/2024       Pertinent PMH Review:   PMH of Pancreatitis: No  PMH of Gallbladder disease: No  PMH of Delayed Gastric Emptying: No  GI issues? GERD  Frequency of BM? Daily  PMH of MTC: No  PMH of Retinopathy: No  PMH of Suicidal Ideation: No    Topiramate:   Migraines? No    Adipex:   Anxiety? Yes    Thyroid health:   Lab Results   Component Value Date    TSH 3.63 08/06/2024        HRT  -Estradiol 1mg oral and IUD    Medications potentially contributing to weight gain:   None    Medications tried/stopped for weight management:    None     Concurrent Chronic Medical Conditions:   Cardiovascular Health  The 10-year ASCVD risk score (David PAYAN, et al., 2019) is: 0.4%    Values used to calculate the score:      Age: 43 years      Sex: Female      Is Non- : No      Diabetic: No      Tobacco smoker: No      Systolic Blood Pressure: 118 mmHg      Is BP treated: No      HDL Cholesterol: 67.8 mg/dL      Total Cholesterol: 174 mg/dL    Lab Results   Component Value Date    CHOL 174 08/06/2024     Lab Results   Component Value Date    HDL 67.8 08/06/2024     Lab Results   Component Value Date    LDLCALC 74 08/06/2024     Lab Results   Component Value Date    TRIG 163 (H) 08/06/2024     No components found for: \"CHOLHDL\"   BP Readings from Last 3 Encounters:   07/31/24 118/70   07/22/24 126/68   06/26/23 107/71      Blood Sugar " "Balance  Lab Results   Component Value Date    GLUCOSE 92 08/06/2024    HGBA1C 5.2 08/06/2024     Lab Results   Component Value Date    INSULFAST 15 08/06/2024         Thyroid  Lab Results   Component Value Date    TSH 3.63 08/06/2024     Iron Status  No results found for: \"IRON\", \"TIBC\", \"FERRITIN\"     Kidney Function  Lab Results   Component Value Date    GFRF >90 05/27/2023    CREATININE 0.80 08/06/2024       Potassium  Lab Results   Component Value Date    K 4.3 08/06/2024        Vitamin D3  Lab Results   Component Value Date    VITD25 39 08/06/2024   Recommended 2000 international units  daily, with a fat containing meal    Current Outpatient Medications on File Prior to Visit   Medication Sig Dispense Refill    cholecalciferol (Vitamin D-3) 25 MCG (1000 UT) capsule Take by mouth.      estradiol (Estrace) 1 mg tablet Take 1 tablet (1 mg) by mouth once daily. 30 tablet 11    estradiol (Estrace) 1 mg tablet Take 1 tablet (1 mg) by mouth once daily. 90 tablet 3    levothyroxine (Synthroid, Levoxyl) 75 mcg tablet Take 0.5 tablets (37.5 mcg) by mouth once daily in the morning. Take on empty stomach 45 tablet 0    multivitamin tablet Take by mouth.      progesterone (Prometrium) 200 mg capsule Take one pill by mouth at bedtime for 14 days/month 14 capsule 11    sertraline (Zoloft) 100 mg tablet Take 1.5 tablets (150 mg) by mouth once daily. 135 tablet 0     No current facility-administered medications on file prior to visit.        Lifestyle and Nourishment Recommendations  Currently 3 meals daily with a mid morning and mid afternoon snack, sometimes after dinner snack.   Dinner at 8pm on gym days  Not gym days: 6:30pm  Bedtime: 9:30-10:30pm  Ideally last food 3 hours prior to bedtime.   Encouraged 1/2 of plate colorful vegetables at each meal  Focus on whole foods as close to nature as possible (less than 5 ingredients on the label)  Increase high quality protein to 25-30 grams per meal along with 2-3 x/week " "resistance training  Discussed Force of [employer] Meats  Patient had questions regarding protein shakes, currently drinking Fairlife protein shakes, recommended discontinuation. Will send recommendation for Whey Protein powder.   Include daily walking (150 min/week) and mind/body activities (meditation, mindfulness, journaling, etc.) for a minimum of 2-5 minutes daily to reduce cortisol levels.   Increase fiber 25-30 grams daily (work up slowly to avoid GI distress)  Goal: Daily bowel movement  Beverages: Focus on water, organic tea (loose leave or in paper, avoid plastic sachets), or sparkling/mineral water (ex. Spindrift, Emir)   Currently drinking 2-3 8pz cups daily. Last 3-4pm.   No more than 1 cup (8oz) of organic coffee daily prior to noon. May consider organic green tea.   Avoid alcohol   Avoid ALL artificial sweeteners   Prioritize 7-8 hours of restful sleep nightly.   Turn off electronics 1 hour prior to bedtime, no electronics in the bedroom.  Establish a regular sleep/wake time (+/- 30 minutes)     Medication and allergy reconciliation completed     Drug Interactions   No significant drug interactions identified    Assessment/Plan     Assessment/Plan  Patients goals:   \"To be less than 200 lbs\"   Discussed patients history, current medical conditions, medications, as well as current diet and lifestyle in depth.   Patient has been making diet/lifestyle changes for: >10 years  BMI: 35  MARLENI IR: 3.4 (>2 indicates potential insulin resistance  Concurrent medical conditions: hypothyroidism.   TSH 3.63  Recommend dose increase to 50 mcg (last rx written 6/30/24, ~8 weeks, by PCP).   Will send PCP a message to request increase.   Per Dr. Ott ok to increase.   Currently taking upon waking with water. Waiting 30 mins to eat.     Patient has been on vacation and is back and ready to get started.   Reviewed labs  She has increased Vitamin D to 1000 internatal units daily  Recommended increased " levothyroxine  Discussed elevated MARLENI-IR  Iman would like to try first obtaining a GLP-1 through insurance. If no coverage, would consider Metformin XL 500mg daily.   Provided education on moa, dosing, administration, side effects of metformin.     Reviewed the risks vs. benefits of available medications that may help promote weight loss.   GLP-1 Education  GLP-1 medications work by stimulating insulin release from the pancreas, slowing gastric emptying, inhibiting post meal glucagon release, and reducing appetite.   Discussed benefits of GLP-1 including lowering blood sugar, blood pressure, improving lipid profile, improving fatty liver disease, reducing the risk of heart disease and kidney disease, weight loss, and delaying the progression of diabetes-related nephropathy.   40-50% of weight loss may come from lean muscle mass. It is very important to continue regular exercise including activities that maintain/build muscle mass while taking this medication.   Side effects could include but are not limited to low blood sugar (hypoglycemia), loss of appetite, nausea, vomiting, diarrhea, and delayed gastric emptying.   Encouraged smaller meals and avoiding high fat meals to minimize nausea.   Discussed treating hypoglycemia with 15 grams of carbohydrates (1/2 glass of juice, piece of fruit, 3-4 glucose tablets)  Rare but serious side effects could include but are not limited to gallbladder disease, pancreatitis, medullary thyroid cancer, acute kidney injury, worsening diabetes related retinopathy, gastroparesis, and bowel obstruction.   If you would experience increased depression or suicidal thoughts while taking this medication contact your PCP immediately.       Follow-up: 9/24/24 at 2:30pm     Time spent with pt: Total length of time 30 (minutes) of the encounter and more than 50% was spent counseling the patient.    Carmina Elizalde, Pharm.D, [facility], [facility]  Clinical Pharmacist  Pharmacy  Services  [phone]    Continue all meds under the continuation of care with the referring provider and clinical pharmacy team.    Verbal consent to manage patient's drug therapy was obtained from the patient and/or an individual authorized to act on behalf of a patient. They were informed they may decline to participate or withdraw from participation in pharmacy services at any time.

## 2024-08-20 ENCOUNTER — APPOINTMENT (OUTPATIENT)
Dept: PHARMACY | Facility: HOSPITAL | Age: 44
End: 2024-08-20
Payer: COMMERCIAL

## 2024-08-20 DIAGNOSIS — R63.4 WEIGHT LOSS: Primary | ICD-10-CM

## 2024-08-20 DIAGNOSIS — E03.9 HYPOTHYROIDISM, UNSPECIFIED TYPE: ICD-10-CM

## 2024-08-20 PROCEDURE — RXMED WILLOW AMBULATORY MEDICATION CHARGE

## 2024-08-20 RX ORDER — METFORMIN HYDROCHLORIDE 500 MG/1
500 TABLET, EXTENDED RELEASE ORAL NIGHTLY
Qty: 90 TABLET | Refills: 3 | Status: SHIPPED | OUTPATIENT
Start: 2024-08-20 | End: 2025-09-24

## 2024-08-20 RX ORDER — LEVOTHYROXINE SODIUM 50 UG/1
50 TABLET ORAL DAILY
Qty: 90 TABLET | Refills: 3 | Status: SHIPPED | OUTPATIENT
Start: 2024-08-20 | End: 2025-08-20

## 2024-08-20 NOTE — Clinical Note
Good morning, I am working with Mrs. Wooten on weight loss and hormone management as part of Francisco Javier Cope's team. Her recent TSH came back at 3.63 on levothyroxine 37.5mg. Would you be ok if I increase her up to 50 mcg daily? Oral estradiol does not affect normal thyroid function; however, it may impact doses of thyroid medication in women treated for hypothyroidism.   Carmina Morgan

## 2024-08-22 ENCOUNTER — PHARMACY VISIT (OUTPATIENT)
Dept: PHARMACY | Facility: CLINIC | Age: 44
End: 2024-08-22
Payer: COMMERCIAL

## 2024-09-23 ENCOUNTER — APPOINTMENT (OUTPATIENT)
Dept: PRIMARY CARE | Facility: CLINIC | Age: 44
End: 2024-09-23
Payer: COMMERCIAL

## 2024-09-23 VITALS
WEIGHT: 232 LBS | HEART RATE: 76 BPM | BODY MASS INDEX: 35.16 KG/M2 | OXYGEN SATURATION: 97 % | SYSTOLIC BLOOD PRESSURE: 115 MMHG | HEIGHT: 68 IN | DIASTOLIC BLOOD PRESSURE: 73 MMHG

## 2024-09-23 DIAGNOSIS — E03.9 ACQUIRED HYPOTHYROIDISM: ICD-10-CM

## 2024-09-23 DIAGNOSIS — Z12.11 ENCOUNTER FOR SCREENING FOR MALIGNANT NEOPLASM OF COLON: ICD-10-CM

## 2024-09-23 DIAGNOSIS — F41.1 GAD (GENERALIZED ANXIETY DISORDER): ICD-10-CM

## 2024-09-23 DIAGNOSIS — F33.42 RECURRENT MAJOR DEPRESSIVE DISORDER, IN FULL REMISSION (CMS-HCC): ICD-10-CM

## 2024-09-23 DIAGNOSIS — Z00.00 ANNUAL PHYSICAL EXAM: Primary | ICD-10-CM

## 2024-09-23 DIAGNOSIS — E66.01 CLASS 2 SEVERE OBESITY DUE TO EXCESS CALORIES WITH SERIOUS COMORBIDITY AND BODY MASS INDEX (BMI) OF 35.0 TO 35.9 IN ADULT: ICD-10-CM

## 2024-09-23 PROCEDURE — 3008F BODY MASS INDEX DOCD: CPT | Performed by: FAMILY MEDICINE

## 2024-09-23 PROCEDURE — 99396 PREV VISIT EST AGE 40-64: CPT | Performed by: FAMILY MEDICINE

## 2024-09-23 PROCEDURE — 1036F TOBACCO NON-USER: CPT | Performed by: FAMILY MEDICINE

## 2024-09-23 RX ORDER — SERTRALINE HYDROCHLORIDE 100 MG/1
150 TABLET, FILM COATED ORAL DAILY
Qty: 135 TABLET | Refills: 3 | Status: SHIPPED | OUTPATIENT
Start: 2024-09-23

## 2024-09-23 RX ORDER — LEVONORGESTREL 52 MG/1
1 INTRAUTERINE DEVICE INTRAUTERINE ONCE
COMMUNITY

## 2024-09-23 RX ORDER — TIRZEPATIDE 2.5 MG/.5ML
2.5 INJECTION, SOLUTION SUBCUTANEOUS
Qty: 2 ML | Refills: 2 | Status: SHIPPED | OUTPATIENT
Start: 2024-09-23

## 2024-09-23 ASSESSMENT — PATIENT HEALTH QUESTIONNAIRE - PHQ9
SUM OF ALL RESPONSES TO PHQ9 QUESTIONS 1 AND 2: 0
1. LITTLE INTEREST OR PLEASURE IN DOING THINGS: NOT AT ALL
2. FEELING DOWN, DEPRESSED OR HOPELESS: NOT AT ALL

## 2024-09-23 ASSESSMENT — COLUMBIA-SUICIDE SEVERITY RATING SCALE - C-SSRS
2. HAVE YOU ACTUALLY HAD ANY THOUGHTS OF KILLING YOURSELF?: NO
1. IN THE PAST MONTH, HAVE YOU WISHED YOU WERE DEAD OR WISHED YOU COULD GO TO SLEEP AND NOT WAKE UP?: NO

## 2024-09-23 NOTE — PROGRESS NOTES
"  Patient ID: Iman Wooten is a 43 y.o. female who presents for No chief complaint on file..    Referring Provider: Francisco Javier Cope  Pt was referred for weight loss     Preferred Pharmacy:    [facility]/pharmacy #[id number] - [facility], [place] - [address]  [address]  [facility] [address]  Phone: [phone] Fax: [phone]    [facility] - [place], [place] - [address]  [address] [facility] [address]  Phone: [phone] Fax: [phone]    [facility]  [address], [address]  [facility] [address]  Phone: [phone] Fax: [phone]      Subjective    Accidentally removed IUD a \"couple of months ago\" - started progesterone while out, getting a new one placed tomorrow.     While IUD was in, no bleeding.   When IUD came out, returned to having a cycle following 14 days of progesterone.    -Heavy with clots, cramps    Any plans for a pregnancy in the next year: No    Uterus: Yes    Movement:   Joint pain? No- has been on estradiol x ~1 year  Strength training? Jazzercise - weights component to each hour long class, 40% cardio and 20% free weights.   Recommended listening to podcast with Alina White  Recommend 2-3x/week for 20 minutes  Osteopenia or osteoporosis: No    Stress Level (rate 1-10): 7-8 out of 10   7 yo daughter with Autism   Exercise is a stress reliever  Started with [facility] therapist - daughter + weight & body image  Recommend daily 2-5 minutes of mindfulness, meditation, journaling, etc to help reduce cortisol levels.     Depression? Yes, life long on sertraline    Sleep:   Quantity/Quality/Regularity? \"Not great\" - 5-6 hours nightly, wears fit bit sleep quality is \"fair\".   Daytime brain fog/memory troubles? Yes- estradiol helped, but still noticeable.   Tobacco? No  Alcohol? Yes. 1 glass of wine every couple day  Recommended reducing alcohol consumption  THC? No    Patient identified goals:       Onset:   3-4 years ago " (COVID work from home)  Nothing else had changed, eating/moving the same, exercising more.   Overall weight gain about 50 lbs  Noticing ankles hurting more during jazzercise. 3-4 days/week in studio and 1-2 mornings at home.     Current challenges:   Mental - has been on and off diets entire life  Most consistent weight was 180  Adverse to tracking  Time  Feeling short on space for meal prep/planning    How long implementing diet/lifestyle change for weight loss:   Lifelong    Disordered eating patterns:   None    Weight Loss Drug Therapy Options Screening:     GLP-1 and Metformin:     Pre-Diabetes/Diabetes? No  Lab Results   Component Value Date    INSULFAST 15 08/06/2024    HGBA1C 5.2 08/06/2024       Pertinent PMH Review:   PMH of Pancreatitis: No  PMH of Gallbladder disease: No  PMH of Delayed Gastric Emptying: No  GI issues? GERD  Frequency of BM? Daily  PMH of MTC: No  PMH of Retinopathy: No  PMH of Suicidal Ideation: No    Topiramate:   Migraines? No    Adipex:   Anxiety? Yes    Thyroid health:   Lab Results   Component Value Date    TSH 3.63 08/06/2024        HRT  -Estradiol 1mg oral and IUD    Medications potentially contributing to weight gain:   None    Medications tried/stopped for weight management:    None     Concurrent Chronic Medical Conditions:   Cardiovascular Health  The 10-year ASCVD risk score (David PAYAN, et al., 2019) is: 0.4%    Values used to calculate the score:      Age: 43 years      Sex: Female      Is Non- : No      Diabetic: No      Tobacco smoker: No      Systolic Blood Pressure: 118 mmHg      Is BP treated: No      HDL Cholesterol: 67.8 mg/dL      Total Cholesterol: 174 mg/dL    Lab Results   Component Value Date    CHOL 174 08/06/2024     Lab Results   Component Value Date    HDL 67.8 08/06/2024     Lab Results   Component Value Date    LDLCALC 74 08/06/2024     Lab Results   Component Value Date    TRIG 163 (H) 08/06/2024     No components found for:  "\"CHOLHDL\"   BP Readings from Last 3 Encounters:   07/31/24 118/70   07/22/24 126/68   06/26/23 107/71      Blood Sugar Balance  Lab Results   Component Value Date    GLUCOSE 92 08/06/2024    HGBA1C 5.2 08/06/2024     Lab Results   Component Value Date    INSULFAST 15 08/06/2024         Thyroid  Lab Results   Component Value Date    TSH 3.63 08/06/2024     Iron Status  No results found for: \"IRON\", \"TIBC\", \"FERRITIN\"     Kidney Function  Lab Results   Component Value Date    GFRF >90 05/27/2023    CREATININE 0.80 08/06/2024       Potassium  Lab Results   Component Value Date    K 4.3 08/06/2024        Vitamin D3  Lab Results   Component Value Date    VITD25 39 08/06/2024   Recommended 2000 international units  daily, with a fat containing meal    Current Outpatient Medications on File Prior to Visit   Medication Sig Dispense Refill    cholecalciferol (Vitamin D-3) 25 MCG (1000 UT) capsule Take by mouth.      estradiol (Estrace) 1 mg tablet Take 1 tablet (1 mg) by mouth once daily. 30 tablet 11    estradiol (Estrace) 1 mg tablet Take 1 tablet (1 mg) by mouth once daily. 90 tablet 3    levothyroxine (Synthroid, Levoxyl) 50 mcg tablet Take 1 tablet (50 mcg) by mouth early in the morning.. Take on an empty stomach at the same time each day, either 30 to 60 minutes prior to breakfast 90 tablet 3    metFORMIN XR (Glucophage-XR) 500 mg 24 hr tablet Take 1 tablet (500 mg) by mouth once daily at bedtime. Do not crush, chew, or split. 90 tablet 3    multivitamin tablet Take by mouth.      progesterone (Prometrium) 200 mg capsule Take one pill by mouth at bedtime for 14 days/month 14 capsule 11    sertraline (Zoloft) 100 mg tablet Take 1.5 tablets (150 mg) by mouth once daily. 135 tablet 0    tirzepatide, weight loss, (Zepbound) 2.5 mg/0.5 mL injection Inject 2.5 mg under the skin every 7 days. 2 mL 3     No current facility-administered medications on file prior to visit.        Lifestyle and Nourishment " "Recommendations  Currently 3 meals daily with a mid morning and mid afternoon snack, sometimes after dinner snack.   Dinner at 8pm on gym days  Not gym days: 6:30pm  Bedtime: 9:30-10:30pm  Ideally last food 3 hours prior to bedtime.   Encouraged 1/2 of plate colorful vegetables at each meal  Focus on whole foods as close to nature as possible (less than 5 ingredients on the label)  Increase high quality protein to 25-30 grams per meal along with 2-3 x/week resistance training  Discussed [employer] of Nature Meats  Patient had questions regarding protein shakes, currently drinking Fairlife protein shakes, recommended discontinuation. Will send recommendation for Whey Protein powder.   Include daily walking (150 min/week) and mind/body activities (meditation, mindfulness, journaling, etc.) for a minimum of 2-5 minutes daily to reduce cortisol levels.   Increase fiber 25-30 grams daily (work up slowly to avoid GI distress)  Goal: Daily bowel movement  Beverages: Focus on water, organic tea (loose leave or in paper, avoid plastic sachets), or sparkling/mineral water (ex. Spindrift, Emir)   Currently drinking 2-3 8pz cups daily. Last 3-4pm.   No more than 1 cup (8oz) of organic coffee daily prior to noon. May consider organic green tea.   Avoid alcohol   Avoid ALL artificial sweeteners   Prioritize 7-8 hours of restful sleep nightly.   Turn off electronics 1 hour prior to bedtime, no electronics in the bedroom.  Establish a regular sleep/wake time (+/- 30 minutes)     Medication and allergy reconciliation completed     Drug Interactions   No significant drug interactions identified    Assessment/Plan     Assessment/Plan  Patients goals:   \"To be less than 200 lbs\"   Discussed patients history, current medical conditions, medications, as well as current diet and lifestyle in depth.   Patient has been making diet/lifestyle changes for: >10 years  BMI: 35  MARLENI IR: 3.4 (>2 indicates potential insulin resistance  Concurrent " medical conditions: hypothyroidism.   Levothyroxine was increased to 50 mcg, tolerating well.   She met with her PCP this week. PCP discussed with her that sertraline may contribute to weight gain. She is hesitant to change this because it has been working well for her. We discussed that if she does try to reduce dose very slowly (25 mg increments) over months. Had negative experience in the past with rapid reduction of sertraline due to pregnancy.   Discussed elevated MARLENI-IR  Iman is currently taking Metformin XL 500mg daily. She hasn't had any side effects, but doesn't feel any different. Same hunger level.   We discussed that if she starts zepbound, we will discontinue metformin.   She would prefer to increasing the dose of metformin to two tablets at bedtime as she is still considering if she would like to start zepbound or not.   We discussed pricing of the zepbound vials.  Discussed patient concerns with regard to hair loss.       Follow-up: 10/24/24 at 3pm     Time spent with pt: Total length of time 30 (minutes) of the encounter and more than 50% was spent counseling the patient.    Carmina Elizalde, Pharm.D, [facility], [facility]  Clinical Pharmacist  Pharmacy Services  [phone]    Continue all meds under the continuation of care with the referring provider and clinical pharmacy team.    Verbal consent to manage patient's drug therapy was obtained from the patient and/or an individual authorized to act on behalf of a patient. They were informed they may decline to participate or withdraw from participation in pharmacy services at any time.

## 2024-09-23 NOTE — PROGRESS NOTES
"Subjective   Patient ID: Iman Wooten is a 43 y.o. female who presents for Annual Exam.    Last Annual Physical: June 2023   Last Dental Visit: Up-to-date   Last Eye exam: Up-to-date   Hearing Concerns: No   Diet: Well- balanced  Exercise Routine: Some exercise       HPI   The patient reports that she is taking levothyroxine for hypothyroidism, Sertraline for anxiety and depression and she also has a Mirena IUD and is taking estradiol for menopausal symptoms. She is taking these medications as prescribed and is tolerating them well. She is also following up with OBGYN. She mentions that she started metformin to aid with weight loss 1 month ago however, she has not noticed any difference in her weight since starting this medication. She has also been talking to a clinical pharmacist and has been practicing portion control and healthy eating but she is still having a difficult time losing weight.    Review of Systems  Constitutional: No fever or chills, No Night Sweats  Eyes: No Blurry Vision or Eye sight problems  ENT: No Nasal Discharge, Hoarseness, sore throat  Cardiovascular: no chest pain, no palpitations and no syncope.   Respiratory: no cough, no shortness of breath during exertion and no shortness of breath at rest.   Gastrointestinal: no abdominal pain, no nausea and no vomiting.   : No vaginal discharge, burning with urination, no blood in urine or stools  Skin: No Skin rashes or Lesions  Neuro: No Headache, no dizziness or Numbness or tingling  Psych: No Anxiety, depression or sleeping problems  Heme: No Easy bleeding or brusing.     Objective   /73   Pulse 76   Ht 1.727 m (5' 8\")   Wt 105 kg (232 lb)   SpO2 97%   BMI 35.28 kg/m²     Physical Exam  Constitutional: Alert and in no acute distress. Well developed, well nourished.   Head and Face: Head and face: Normal.    Eyes: Normal external exam. Pupils were equal in size, round, reactive to light (PERRL) with normal accommodation and " extraocular movements intact (EOMI).   Ears, Nose, Mouth, and Throat: External inspection of ears and nose: Normal.  Hearing: Normal.  Nasal mucosa, septum, and turbinates: Normal.  Lips, teeth, and gums: Normal.  Oropharynx: Normal.   Neck: No neck mass was observed. Supple. Thyroid not enlarged and there were no palpable thyroid nodules.   Cardiovascular: Heart rate and rhythm were normal, normal S1 and S2. Pedal pulses: Normal. No peripheral edema.   Pulmonary: No respiratory distress. Clear bilateral breath sounds.   Abdomen: Soft nontender; no abdominal mass palpated. Normal bowel sounds. No organomegaly.   Musculoskeletal: No joint swelling seen, normal movements of all extremities. Range of motion: Normal.  Muscle strength/tone: Normal.    Skin: Normal skin color and pigmentation, normal skin turgor, and no rash.   Neurologic: Deep tendon reflexes were 2+ and symmetric.   Psychiatric: Judgment and insight: Intact. Mood and affect: Normal.  Lymphatic: No cervical lymphadenopathy. Palpation of lymph nodes in axillae: Normal.  Palpation of lymph nodes in groin: Normal.    Lab Results   Component Value Date    WBC 7.6 08/06/2024    HGB 14.9 08/06/2024    HCT 45.7 08/06/2024     08/06/2024    CHOL 174 08/06/2024    TRIG 163 (H) 08/06/2024    HDL 67.8 08/06/2024    ALT 11 08/06/2024    AST 14 08/06/2024     08/06/2024    K 4.3 08/06/2024     08/06/2024    CREATININE 0.80 08/06/2024    BUN 15 08/06/2024    CO2 24 08/06/2024    TSH 3.63 08/06/2024    HGBA1C 5.2 08/06/2024       XR KNEE COMPLETE 4 OR MORE VIEWS  MRN: [id number]  Patient Name: ERIN AUGUSTIN     STUDY:  BN KNEE; COMPLT, 4 OR MORE VIEWS; 3/30/2017 3:10 pm     INDICATION:  Signs/Symptoms: right knee pain.     COMPARISON:  None.     ACCESSION NUMBER(S):  [id number]     ORDERING CLINICIAN:  SAURABH NAVARRO     FINDINGS:  Osseous structures and soft tissues appear normal. No fracture or  dislocation is noted     IMPRESSION:  Normal right  knee         Assessment/Plan   Diagnoses and all orders for this visit:  Annual physical exam  Class 2 severe obesity due to excess calories with serious comorbidity and body mass index (BMI) of 35.0 to 35.9 in adult (Multi)  -     tirzepatide, weight loss, (Zepbound) 2.5 mg/0.5 mL solution; Inject 2.5 mg under the skin every 7 days.  Acquired hypothyroidism  Recurrent major depressive disorder, in full remission (CMS-HCC)  -     sertraline (Zoloft) 100 mg tablet; Take 1.5 tablets (150 mg) by mouth once daily.  HERO (generalized anxiety disorder)  -     sertraline (Zoloft) 100 mg tablet; Take 1.5 tablets (150 mg) by mouth once daily.  Encounter for screening for malignant neoplasm of colon  -     Colonoscopy Screening; Average Risk Patient; Future        Dear Iman Wooten     It was my pleasure to take care of you today in the office. Below are the things we discussed today:    1. 1. Immunizations: Yearly Flu shot is recommended. The patient will get in a couple weeks         a: COVID: Please get booster from the pharmacy          b: Tetanus: Up-to-date. Due 2026     2. Blood Work: Reviewed  3. Seen your dentist twice a year  4. Yearly Eye exam is recommended    5. BMI: Obese   6: Diet recommendations:   Eat Clean, Try to have as many home cooked meals as possible  Avoid processed foods which contain excess calories, sugar, and sodium.    7. Exercise recommendations:   150 minutes a week to maintain your weight     If you have to loose weight, you need a better diet and exercise plan.     8. Supplements recommended:  a - Calcium 600 mg up to twice a day to get a total of 1200 mg. Each 8 oz of milk or yogurt or 1 oz of cheese, 1 Banana, 1 serving of green Leafy vegetable has about 300 mg of Calcium, so you may subtract that amount. Calcium citrate is the only acceptable supplement to take if you take an acid suppressing medication like Prilosec; otherwise Calcium carbonate is acceptable too (It can cause  Constipation).   b - Vitamin D - 2000 IU daily     9. Please get your Living will / Advance directive completed if you do not have one already. Please make sure our office has a copy of the latest one.     10. Colonoscopy: Ordered for Nov 2024   11. Mammogram: Uptodate. The patient is following up at [facility]  12. PAP: Last done in July 2024. Cytology and HPV negative   13. Skin Check: Please see Dermatology once a year for a Skin Check.     14. Hypothyroidism: Continue levothyroxine.      15. Anxiety and depression: Well- controlled. Continue Sertraline.      16. Menopausal symptoms: The patient has a Mirena IUD and is taking estradiol. She is seeing OBGYN.    17. Obesity: The patient is taking metformin. Encouraged her to practice portion control and decrease her daily caloric intake. Advised her to avoid eating when she is not hungry. Zepbound ordered.     Follow up in one year for a Physical. Please call the office before your Physical to see if you need blood work completed prior to your physical.     Please call me if any questions arise from now until your next visit. I will call you after I am done seeing patients. A Doctor is always available by phone when the office is closed. Please feel free to call for help with any problem that you feel shouldn't wait until the office re-opens.     Scribe Attestation  By signing my name below, IFlorinda Scribe   attest that this documentation has been prepared under the direction and in the presence of Dahlia Ott MD.

## 2024-09-24 ENCOUNTER — APPOINTMENT (OUTPATIENT)
Dept: PHARMACY | Facility: HOSPITAL | Age: 44
End: 2024-09-24
Payer: COMMERCIAL

## 2024-09-24 DIAGNOSIS — E03.9 HYPOTHYROIDISM, UNSPECIFIED TYPE: ICD-10-CM

## 2024-09-24 DIAGNOSIS — R63.4 WEIGHT LOSS: ICD-10-CM

## 2024-09-24 PROCEDURE — RXMED WILLOW AMBULATORY MEDICATION CHARGE

## 2024-09-24 RX ORDER — METFORMIN HYDROCHLORIDE 500 MG/1
1000 TABLET, EXTENDED RELEASE ORAL NIGHTLY
Qty: 180 TABLET | Refills: 3 | Status: SHIPPED | OUTPATIENT
Start: 2024-09-24 | End: 2025-10-29

## 2024-09-25 RX ORDER — LEVOTHYROXINE SODIUM 50 UG/1
50 TABLET ORAL EVERY MORNING
Qty: 90 TABLET | Refills: 3 | Status: SHIPPED | OUTPATIENT
Start: 2024-09-25

## 2024-09-26 ENCOUNTER — PHARMACY VISIT (OUTPATIENT)
Dept: PHARMACY | Facility: CLINIC | Age: 44
End: 2024-09-26
Payer: COMMERCIAL

## 2024-10-02 DIAGNOSIS — Z12.11 COLON CANCER SCREENING: ICD-10-CM

## 2024-10-02 RX ORDER — POLYETHYLENE GLYCOL 3350, SODIUM SULFATE ANHYDROUS, SODIUM BICARBONATE, SODIUM CHLORIDE, POTASSIUM CHLORIDE 236; 22.74; 6.74; 5.86; 2.97 G/4L; G/4L; G/4L; G/4L; G/4L
POWDER, FOR SOLUTION ORAL
Qty: 4000 ML | Refills: 0 | Status: SHIPPED | OUTPATIENT
Start: 2024-10-02

## 2024-10-09 DIAGNOSIS — Z30.09 GENERAL COUNSELING AND ADVICE FOR CONTRACEPTIVE MANAGEMENT: Primary | ICD-10-CM

## 2024-10-09 DIAGNOSIS — Z79.890 MENOPAUSAL SYNDROME ON HORMONE REPLACEMENT THERAPY: ICD-10-CM

## 2024-10-09 DIAGNOSIS — N95.1 MENOPAUSAL SYNDROME ON HORMONE REPLACEMENT THERAPY: ICD-10-CM

## 2024-10-09 RX ORDER — PROGESTERONE 200 MG/1
CAPSULE ORAL
Qty: 14 CAPSULE | Refills: 11 | Status: SHIPPED | OUTPATIENT
Start: 2024-10-09 | End: 2024-10-11 | Stop reason: ALTCHOICE

## 2024-10-09 NOTE — PROGRESS NOTES
IUD expelled x2, pt on tirzepatide and oral contraceptives are contraindicated; MP prescribed for uterine protection but will discuss options for contraception

## 2024-10-11 DIAGNOSIS — Z30.09 GENERAL COUNSELING AND ADVICE FOR CONTRACEPTIVE MANAGEMENT: Primary | ICD-10-CM

## 2024-10-11 RX ORDER — ETONOGESTREL AND ETHINYL ESTRADIOL VAGINAL RING .015; .12 MG/D; MG/D
1 RING VAGINAL
Qty: 1 EACH | Refills: 11 | Status: SHIPPED | OUTPATIENT
Start: 2024-10-11 | End: 2025-10-11

## 2024-10-24 ENCOUNTER — APPOINTMENT (OUTPATIENT)
Dept: PHARMACY | Facility: HOSPITAL | Age: 44
End: 2024-10-24
Payer: COMMERCIAL

## 2024-10-24 DIAGNOSIS — E03.9 HYPOTHYROIDISM, UNSPECIFIED TYPE: ICD-10-CM

## 2024-10-24 DIAGNOSIS — R63.4 WEIGHT LOSS: ICD-10-CM

## 2024-10-24 RX ORDER — TIRZEPATIDE 2.5 MG/.5ML
2.5 INJECTION, SOLUTION SUBCUTANEOUS
Qty: 2 ML | Refills: 11 | Status: SHIPPED | OUTPATIENT
Start: 2024-10-24

## 2024-10-24 NOTE — PROGRESS NOTES
"  Patient ID: Iman Wooten is a 43 y.o. female who presents for No chief complaint on file..    Referring Provider: Francisco Javier Cope  Pt was referred for weight loss     Preferred Pharmacy:    [facility]/pharmacy #[id number] - [facility], [place] - [address]  [address]  [facility] [address]  Phone: [phone] Fax: [phone]    [facility] - [place], [place] - [address]  [address] [address]  Phone: [phone] Fax: [phone]    [facility]  [address], [address]  [facility] [address]  Phone: [phone] Fax: [phone]      Subjective    Accidentally removed IUD a \"couple of months ago\" - started progesterone while out, getting a new one placed tomorrow.     While IUD was in, no bleeding.   When IUD came out, returned to having a cycle following 14 days of progesterone.    -Heavy with clots, cramps    Any plans for a pregnancy in the next year: No    Uterus: Yes    Movement:   Joint pain? No- has been on estradiol x ~1 year  Strength training? Jazzercise - weights component to each hour long class, 40% cardio and 20% free weights.   Recommended listening to podcast with Alina White  Recommend 2-3x/week for 20 minutes  Osteopenia or osteoporosis: No    Stress Level (rate 1-10): 7-8 out of 10   9 yo daughter with Autism   Exercise is a stress reliever  Started with [facility] therapist - daughter + weight & body image  Recommend daily 2-5 minutes of mindfulness, meditation, journaling, etc to help reduce cortisol levels.     Depression? Yes, life long on sertraline    Sleep:   Quantity/Quality/Regularity? \"Not great\" - 5-6 hours nightly, wears fit bit sleep quality is \"fair\".   Daytime brain fog/memory troubles? Yes- estradiol helped, but still noticeable.   Tobacco? No  Alcohol? Yes. 1 glass of wine every couple day  Recommended reducing alcohol consumption  THC? No    Patient identified goals:       Onset:   3-4 years ago " (COVID work from home)  Nothing else had changed, eating/moving the same, exercising more.   Overall weight gain about 50 lbs  Noticing ankles hurting more during jazzercise. 3-4 days/week in studio and 1-2 mornings at home.     Current challenges:   Mental - has been on and off diets entire life  Most consistent weight was 180  Adverse to tracking  Time  Feeling short on space for meal prep/planning    How long implementing diet/lifestyle change for weight loss:   Lifelong    Disordered eating patterns:   None    Weight Loss Drug Therapy Options Screening:     GLP-1 and Metformin:     Pre-Diabetes/Diabetes? No  Lab Results   Component Value Date    INSULFAST 15 08/06/2024    HGBA1C 5.2 08/06/2024       Pertinent PMH Review:   PMH of Pancreatitis: No  PMH of Gallbladder disease: No  PMH of Delayed Gastric Emptying: No  GI issues? GERD  Frequency of BM? Daily  PMH of MTC: No  PMH of Retinopathy: No  PMH of Suicidal Ideation: No    Topiramate:   Migraines? No    Adipex:   Anxiety? Yes    Thyroid health:   Lab Results   Component Value Date    TSH 3.63 08/06/2024        HRT  -Estradiol 1mg oral and IUD    Medications potentially contributing to weight gain:   None    Medications tried/stopped for weight management:    None     Concurrent Chronic Medical Conditions:   Cardiovascular Health  The 10-year ASCVD risk score (David PAYAN, et al., 2019) is: 0.3%    Values used to calculate the score:      Age: 43 years      Sex: Female      Is Non- : No      Diabetic: No      Tobacco smoker: No      Systolic Blood Pressure: 115 mmHg      Is BP treated: No      HDL Cholesterol: 67.8 mg/dL      Total Cholesterol: 174 mg/dL    Lab Results   Component Value Date    CHOL 174 08/06/2024     Lab Results   Component Value Date    HDL 67.8 08/06/2024     Lab Results   Component Value Date    LDLCALC 74 08/06/2024     Lab Results   Component Value Date    TRIG 163 (H) 08/06/2024     No components found for:  "\"CHOLHDL\"   BP Readings from Last 3 Encounters:   09/23/24 115/73   07/31/24 118/70   07/22/24 126/68      Blood Sugar Balance  Lab Results   Component Value Date    GLUCOSE 92 08/06/2024    HGBA1C 5.2 08/06/2024     Lab Results   Component Value Date    INSULFAST 15 08/06/2024         Thyroid  Lab Results   Component Value Date    TSH 3.63 08/06/2024     Iron Status  No results found for: \"IRON\", \"TIBC\", \"FERRITIN\"     Kidney Function  Lab Results   Component Value Date    GFRF >90 05/27/2023    CREATININE 0.80 08/06/2024       Potassium  Lab Results   Component Value Date    K 4.3 08/06/2024        Vitamin D3  Lab Results   Component Value Date    VITD25 39 08/06/2024   Recommended 2000 international units  daily, with a fat containing meal    Current Outpatient Medications on File Prior to Visit   Medication Sig Dispense Refill    cholecalciferol (Vitamin D-3) 25 MCG (1000 UT) capsule Take by mouth.      etonogestreL-ethinyl estradioL (Nuvaring) 0.12-0.015 mg/24 hr vaginal ring Insert 1 each into the vagina every 28 (twenty-eight) days. Insert vaginal ring for 3 weeks, then remove for 1 week. 1 each 11    levothyroxine (Synthroid, Levoxyl) 50 mcg tablet Take 1 tablet (50 mcg) by mouth once daily in the morning. Take on empty stomach 30  to 60 minutes before first meal of the day 90 tablet 3    metFORMIN XR (Glucophage-XR) 500 mg 24 hr tablet Take 2 tablets (1,000 mg) by mouth once daily at bedtime. Do not crush, chew, or split. 180 tablet 3    multivitamin tablet Take by mouth.      polyethylene glycol (GoLYTELY) 236-22.74-6.74 -5.86 gram solution Drink 1/2 starting at 6 pm the night before your procedure then drink the 2nd 1/2 5 hours before procedure arrival time 4000 mL 0    sertraline (Zoloft) 100 mg tablet Take 1.5 tablets (150 mg) by mouth once daily. 135 tablet 3    tirzepatide, weight loss, (Zepbound) 2.5 mg/0.5 mL solution Inject 2.5 mg under the skin every 7 days. 2 mL 2     No current " "facility-administered medications on file prior to visit.        Lifestyle and Nourishment Recommendations  Currently 3 meals daily with a mid morning and mid afternoon snack, sometimes after dinner snack.   Dinner at 8pm on gym days  Not gym days: 6:30pm  Bedtime: 9:30-10:30pm  Ideally last food 3 hours prior to bedtime.   Encouraged 1/2 of plate colorful vegetables at each meal  Focus on whole foods as close to nature as possible (less than 5 ingredients on the label)  Increase high quality protein to 25-30 grams per meal along with 2-3 x/week resistance training  Discussed [employer] Meats  Patient had questions regarding protein shakes, currently drinking Fairlife protein shakes, recommended discontinuation. Will send recommendation for Whey Protein powder.   Include daily walking (150 min/week) and mind/body activities (meditation, mindfulness, journaling, etc.) for a minimum of 2-5 minutes daily to reduce cortisol levels.   Increase fiber 25-30 grams daily (work up slowly to avoid GI distress)  Goal: Daily bowel movement  Beverages: Focus on water, organic tea (loose leave or in paper, avoid plastic sachets), or sparkling/mineral water (ex. [place]rift, Emir)   Currently drinking 2-3 8pz cups daily. Last 3-4pm.   No more than 1 cup (8oz) of organic coffee daily prior to noon. May consider organic green tea.   Avoid alcohol   Avoid ALL artificial sweeteners   Prioritize 7-8 hours of restful sleep nightly.   Turn off electronics 1 hour prior to bedtime, no electronics in the bedroom.  Establish a regular sleep/wake time (+/- 30 minutes)     Medication and allergy reconciliation completed     Drug Interactions   No significant drug interactions identified    Assessment/Plan     Assessment/Plan  Patients goals:   \"To be less than 200 lbs\"   Discussed patients history, current medical conditions, medications, as well as current diet and lifestyle in depth.   Patient has been making diet/lifestyle changes for: " >10 years  BMI: 35  MARLENI IR: 3.4 (>2 indicates potential insulin resistance  Concurrent medical conditions: hypothyroidism.   Levothyroxine was increased to 50 mcg, tolerating well.   Discussed elevated MARLENI-IR  Iman took metformin x 30 days at 500mg XL and x 30 days at 1000mg XL and has not noticed much benefit, she would like to consider zepbound vials.   GLP-1 Education    GLP-1 medications work by stimulating insulin release from the pancreas, slowing gastric emptying, inhibiting post meal glucagon release, and reducing appetite.     Discussed benefits of GLP-1 including lowering blood sugar, blood pressure, improving lipid profile, improving fatty liver disease, reducing the risk of heart disease and kidney disease, weight loss, and delaying the progression of diabetes-related nephropathy.     40-50% of weight loss may come from lean muscle mass. It is very important to continue regular exercise including activities that maintain/build muscle mass while taking this medication.     Side effects could include but are not limited to low blood sugar (hypoglycemia), loss of appetite, nausea, vomiting, diarrhea, and delayed gastric emptying.     Encouraged smaller meals and avoiding high fat meals to minimize nausea.   Discussed treating hypoglycemia with 15 grams of carbohydrates (1/2 glass of juice, piece of fruit, 3-4 glucose tablets)    Rare but serious side effects could include but are not limited to gallbladder disease, pancreatitis, medullary thyroid cancer, acute kidney injury, worsening diabetes related retinopathy, gastroparesis, and bowel obstruction.     If you would experience increased depression or suicidal thoughts while taking this medication contact your PCP immediately.   GLP-1 medications may decrease the efficacy of oral contraceptives.   GLP-1 medications are not safe to take during pregnancy. If you would become pregnant while on a GLP-1 discontinue immediately.     Zepbound Education:      Counseled patient on Zepbound MOA, expectations, side effects, duration of therapy, administration, and monitoring parameters.  Provided detailed dosing and administration counseling to ensure proper technique.   Reviewed Zepbound titration schedule, starting with 2.5 mg once weekly to a goal of 15 mg once weekly if tolerated  Counseled patient on the benefits of GLP-1ra glycemic control and weight loss  Reviewed storage requirements of Zepbound when not in use, and when to administer the medication if a dose is missed.  Advised patient that they may experience improved satiety after meals and portion sizes of meals may be reduced as doses of Zepound increase.    Emailed zepbound vial administration video to patient.     START  Zepbound 2.5mg once weekly    Follow-up: 11/14/24 at 4pm     Time spent with pt: Total length of time 30 (minutes) of the encounter and more than 50% was spent counseling the patient.    Carmina Elizalde, Pharm.D, FAPaul [facility]. [facility], [facility]  Clinical Pharmacist  Pharmacy Services  [phone]    Continue all meds under the continuation of care with the referring provider and clinical pharmacy team.    Verbal consent to manage patient's drug therapy was obtained from the patient and/or an individual authorized to act on behalf of a patient. They were informed they may decline to participate or withdraw from participation in pharmacy services at any time.

## 2024-11-12 NOTE — PROGRESS NOTES
"  Patient ID: Iman Wooten is a 43 y.o. female who presents for No chief complaint on file..    Referring Provider: Francisco Javier Cope  Pt was referred for weight loss     Preferred Pharmacy:    [facility] #[id number] - [facility], [place] - [address]  [address]  [facility] [address]  Phone: [phone] Fax: [phone]    [facility] - [place], [place] - [address]  [address] [facility] [address]  Phone: [phone] Fax: [phone]      Subjective    Accidentally removed IUD a \"couple of months ago\" - started progesterone while out, getting a new one placed tomorrow.     While IUD was in, no bleeding.   When IUD came out, returned to having a cycle following 14 days of progesterone.    -Heavy with clots, cramps    Any plans for a pregnancy in the next year: No    Uterus: Yes    Movement:   Joint pain? No- has been on estradiol x ~1 year  Strength training? Jazzercise - weights component to each hour long class, 40% cardio and 20% free weights.   Recommended listening to podcast with Alina White  Recommend 2-3x/week for 20 minutes  Osteopenia or osteoporosis: No    Stress Level (rate 1-10): 7-8 out of 10   9 yo daughter with Autism   Exercise is a stress reliever  Started with [facility] therapist - daughter + weight & body image  Recommend daily 2-5 minutes of mindfulness, meditation, journaling, etc to help reduce cortisol levels.     Depression? Yes, life long on sertraline    Sleep:   Quantity/Quality/Regularity? \"Not great\" - 5-6 hours nightly, wears fit bit sleep quality is \"fair\".   Daytime brain fog/memory troubles? Yes- estradiol helped, but still noticeable.   Tobacco? No  Alcohol? Yes. 1 glass of wine every couple day  Recommended reducing alcohol consumption  THC? No    Patient identified goals:       Onset:   3-4 years ago (COVID work from home)  Nothing else had changed, eating/moving the same, exercising more.   Overall weight gain about 50 " "lbs  Noticing ankles hurting more during jazzercise. 3-4 days/week in studio and 1-2 mornings at home.     Current challenges:   Mental - has been on and off diets entire life  Most consistent weight was 180  Adverse to tracking  Time  Feeling short on space for meal prep/planning    How long implementing diet/lifestyle change for weight loss:   Lifelong    Disordered eating patterns:   None    Weight Loss Drug Therapy Options Screening:     GLP-1 and Metformin:     Pre-Diabetes/Diabetes? No  Lab Results   Component Value Date    INSULFAST 15 08/06/2024    HGBA1C 5.2 08/06/2024       Pertinent PMH Review:   PMH of Pancreatitis: No  PMH of Gallbladder disease: No  PMH of Delayed Gastric Emptying: No  GI issues? GERD  Frequency of BM? Daily  PMH of MTC: No  PMH of Retinopathy: No  PMH of Suicidal Ideation: No    Topiramate:   Migraines? No    Adipex:   Anxiety? Yes    Thyroid health:   Lab Results   Component Value Date    TSH 3.63 08/06/2024        HRT  -Estradiol 1mg oral and IUD    Medications potentially contributing to weight gain:   None    Medications tried/stopped for weight management:    None     Concurrent Chronic Medical Conditions:   Cardiovascular Health  The 10-year ASCVD risk score (David PAYAN, et al., 2019) is: 0.3%    Values used to calculate the score:      Age: 43 years      Sex: Female      Is Non- : No      Diabetic: No      Tobacco smoker: No      Systolic Blood Pressure: 115 mmHg      Is BP treated: No      HDL Cholesterol: 67.8 mg/dL      Total Cholesterol: 174 mg/dL    Lab Results   Component Value Date    CHOL 174 08/06/2024     Lab Results   Component Value Date    HDL 67.8 08/06/2024     Lab Results   Component Value Date    LDLCALC 74 08/06/2024     Lab Results   Component Value Date    TRIG 163 (H) 08/06/2024     No components found for: \"CHOLHDL\"   BP Readings from Last 3 Encounters:   09/23/24 115/73   07/31/24 118/70   07/22/24 126/68      Blood Sugar " "Balance  Lab Results   Component Value Date    GLUCOSE 92 08/06/2024    HGBA1C 5.2 08/06/2024     Lab Results   Component Value Date    INSULFAST 15 08/06/2024         Thyroid  Lab Results   Component Value Date    TSH 3.63 08/06/2024     Iron Status  No results found for: \"IRON\", \"TIBC\", \"FERRITIN\"     Kidney Function  Lab Results   Component Value Date    GFRF >90 05/27/2023    CREATININE 0.80 08/06/2024       Potassium  Lab Results   Component Value Date    K 4.3 08/06/2024        Vitamin D3  Lab Results   Component Value Date    VITD25 39 08/06/2024   Recommended 2000 international units  daily, with a fat containing meal    Current Outpatient Medications on File Prior to Visit   Medication Sig Dispense Refill    cholecalciferol (Vitamin D-3) 25 MCG (1000 UT) capsule Take by mouth.      etonogestreL-ethinyl estradioL (Nuvaring) 0.12-0.015 mg/24 hr vaginal ring Insert 1 each into the vagina every 28 (twenty-eight) days. Insert vaginal ring for 3 weeks, then remove for 1 week. 1 each 11    levothyroxine (Synthroid, Levoxyl) 50 mcg tablet Take 1 tablet (50 mcg) by mouth once daily in the morning. Take on empty stomach 30  to 60 minutes before first meal of the day 90 tablet 3    multivitamin tablet Take by mouth.      polyethylene glycol (GoLYTELY) 236-22.74-6.74 -5.86 gram solution Drink 1/2 starting at 6 pm the night before your procedure then drink the 2nd 1/2 5 hours before procedure arrival time 4000 mL 0    sertraline (Zoloft) 100 mg tablet Take 1.5 tablets (150 mg) by mouth once daily. 135 tablet 3    tirzepatide, weight loss, (Zepbound) 2.5 mg/0.5 mL solution Inject 2.5 mg under the skin every 7 days. ICD-10 code: z68.35 2 mL 11     No current facility-administered medications on file prior to visit.        Lifestyle and Nourishment Recommendations  Currently 3 meals daily with a mid morning and mid afternoon snack, sometimes after dinner snack.   Dinner at 8pm on gym days  Not gym days: 6:30pm  Bedtime: " "9:30-10:30pm  Ideally last food 3 hours prior to bedtime.   Encouraged 1/2 of plate colorful vegetables at each meal  Focus on whole foods as close to nature as possible (less than 5 ingredients on the label)  Increase high quality protein to 25-30 grams per meal along with 2-3 x/week resistance training  Discussed [employer]ats  Patient had questions regarding protein shakes, currently drinking Fairlife protein shakes, recommended discontinuation. Will send recommendation for Whey Protein powder.   Include daily walking (150 min/week) and mind/body activities (meditation, mindfulness, journaling, etc.) for a minimum of 2-5 minutes daily to reduce cortisol levels.   Increase fiber 25-30 grams daily (work up slowly to avoid GI distress)  Goal: Daily bowel movement  Beverages: Focus on water, organic tea (loose leave or in paper, avoid plastic sachets), or sparkling/mineral water (ex. [place], Emir)   Currently drinking 2-3 8pz cups daily. Last 3-4pm.   No more than 1 cup (8oz) of organic coffee daily prior to noon. May consider organic green tea.   Avoid alcohol   Avoid ALL artificial sweeteners   Prioritize 7-8 hours of restful sleep nightly.   Turn off electronics 1 hour prior to bedtime, no electronics in the bedroom.  Establish a regular sleep/wake time (+/- 30 minutes)     Medication and allergy reconciliation completed     Drug Interactions   No significant drug interactions identified    Assessment/Plan   Patients goals:   \"To be less than 200 lbs\"   Discussed patients history, current medical conditions, medications, as well as current diet and lifestyle in depth.   Patient has been making diet/lifestyle changes for: >10 years  BMI: 35  MARLENI IR: 3.4 (>2 indicates potential insulin resistance  Concurrent medical conditions: hypothyroidism.   Levothyroxine was increased to 50 mcg, tolerating well.   Discussed elevated MARLENI-IR  Iman took metformin x 30 days at 500mg XL and x 30 days at 1000mg XL and " "has not noticed much benefit, she would like to consider zepbound vials.   Zepbound 2.5mg  \"Mild\" side effects, feels like \"gas bubble in between my ribs\" - comes and goes.   Recommend Gas-X (simethicone) and/or fennel tea  Notices some reduced appetite. No nausea.   No weight loss yet.   Switched to Nuvaring - doesn't like the feeling of it.   Has had two IUD's both have fallen out.   Discussed that she would like to consider alternatives, will research to see if there are any additional possibilities. Will discuss at next apt.       INCREASE  Zepbound 5mg once weekly (Saturday)    Follow-up: 12/19/24 3:40pm      Time spent with pt: Total length of time 20 (minutes) of the encounter and more than 50% was spent counseling the patient.    Carmina Elizalde, Pharm.D, [facility]  Clinical Pharmacist  Pharmacy Services  [phone]    Continue all meds under the continuation of care with the referring provider and clinical pharmacy team.    Verbal consent to manage patient's drug therapy was obtained from the patient and/or an individual authorized to act on behalf of a patient. They were informed they may decline to participate or withdraw from participation in pharmacy services at any time.  "

## 2024-11-14 ENCOUNTER — APPOINTMENT (OUTPATIENT)
Dept: PHARMACY | Facility: HOSPITAL | Age: 44
End: 2024-11-14
Payer: COMMERCIAL

## 2024-11-14 DIAGNOSIS — E03.9 HYPOTHYROIDISM, UNSPECIFIED TYPE: ICD-10-CM

## 2024-11-14 DIAGNOSIS — R63.4 WEIGHT LOSS: ICD-10-CM

## 2024-11-14 DIAGNOSIS — K21.00 GASTROESOPHAGEAL REFLUX DISEASE WITH ESOPHAGITIS, UNSPECIFIED WHETHER HEMORRHAGE: Primary | ICD-10-CM

## 2024-11-14 RX ORDER — TIRZEPATIDE 5 MG/.5ML
5 INJECTION, SOLUTION SUBCUTANEOUS
Qty: 2 ML | Refills: 11 | Status: SHIPPED | OUTPATIENT
Start: 2024-11-14

## 2024-11-28 ENCOUNTER — HOSPITAL ENCOUNTER (EMERGENCY)
Facility: HOSPITAL | Age: 44
Discharge: HOME | End: 2024-11-28
Payer: COMMERCIAL

## 2024-11-28 ENCOUNTER — APPOINTMENT (OUTPATIENT)
Dept: RADIOLOGY | Facility: HOSPITAL | Age: 44
End: 2024-11-28
Payer: COMMERCIAL

## 2024-11-28 VITALS
HEART RATE: 87 BPM | SYSTOLIC BLOOD PRESSURE: 139 MMHG | TEMPERATURE: 98.2 F | RESPIRATION RATE: 18 BRPM | BODY MASS INDEX: 33.34 KG/M2 | WEIGHT: 220 LBS | OXYGEN SATURATION: 97 % | HEIGHT: 68 IN | DIASTOLIC BLOOD PRESSURE: 76 MMHG

## 2024-11-28 DIAGNOSIS — N93.9 ABNORMAL UTERINE BLEEDING (AUB): Primary | ICD-10-CM

## 2024-11-28 LAB
ABO GROUP (TYPE) IN BLOOD: NORMAL
ALBUMIN SERPL BCP-MCNC: 4.2 G/DL (ref 3.4–5)
ALP SERPL-CCNC: 32 U/L (ref 33–110)
ALT SERPL W P-5'-P-CCNC: 14 U/L (ref 7–45)
ANION GAP SERPL CALC-SCNC: 14 MMOL/L (ref 10–20)
ANTIBODY SCREEN: NORMAL
AST SERPL W P-5'-P-CCNC: 18 U/L (ref 9–39)
B-HCG SERPL-ACNC: <3 MIU/ML
BASOPHILS # BLD AUTO: 0.06 X10*3/UL (ref 0–0.1)
BASOPHILS NFR BLD AUTO: 0.9 %
BILIRUB SERPL-MCNC: 1 MG/DL (ref 0–1.2)
BUN SERPL-MCNC: 12 MG/DL (ref 6–23)
CALCIUM SERPL-MCNC: 9.7 MG/DL (ref 8.6–10.6)
CHLORIDE SERPL-SCNC: 109 MMOL/L (ref 98–107)
CO2 SERPL-SCNC: 21 MMOL/L (ref 21–32)
CREAT SERPL-MCNC: 0.92 MG/DL (ref 0.5–1.05)
EGFRCR SERPLBLD CKD-EPI 2021: 79 ML/MIN/1.73M*2
EOSINOPHIL # BLD AUTO: 0.32 X10*3/UL (ref 0–0.7)
EOSINOPHIL NFR BLD AUTO: 4.8 %
ERYTHROCYTE [DISTWIDTH] IN BLOOD BY AUTOMATED COUNT: 11.9 % (ref 11.5–14.5)
GLUCOSE SERPL-MCNC: 89 MG/DL (ref 74–99)
HCT VFR BLD AUTO: 40 % (ref 36–46)
HGB BLD-MCNC: 13.9 G/DL (ref 12–16)
IMM GRANULOCYTES # BLD AUTO: 0.01 X10*3/UL (ref 0–0.7)
IMM GRANULOCYTES NFR BLD AUTO: 0.2 % (ref 0–0.9)
INR PPP: 0.9 (ref 0.9–1.1)
LYMPHOCYTES # BLD AUTO: 1.64 X10*3/UL (ref 1.2–4.8)
LYMPHOCYTES NFR BLD AUTO: 24.7 %
MCH RBC QN AUTO: 30.5 PG (ref 26–34)
MCHC RBC AUTO-ENTMCNC: 34.8 G/DL (ref 32–36)
MCV RBC AUTO: 88 FL (ref 80–100)
MONOCYTES # BLD AUTO: 0.39 X10*3/UL (ref 0.1–1)
MONOCYTES NFR BLD AUTO: 5.9 %
NEUTROPHILS # BLD AUTO: 4.23 X10*3/UL (ref 1.2–7.7)
NEUTROPHILS NFR BLD AUTO: 63.5 %
NRBC BLD-RTO: 0 /100 WBCS (ref 0–0)
PLATELET # BLD AUTO: 320 X10*3/UL (ref 150–450)
POTASSIUM SERPL-SCNC: 3.9 MMOL/L (ref 3.5–5.3)
PREGNANCY TEST URINE, POC: NEGATIVE
PROT SERPL-MCNC: 6.9 G/DL (ref 6.4–8.2)
PROTHROMBIN TIME: 10.6 SECONDS (ref 9.8–12.8)
RBC # BLD AUTO: 4.55 X10*6/UL (ref 4–5.2)
RH FACTOR (ANTIGEN D): NORMAL
SODIUM SERPL-SCNC: 140 MMOL/L (ref 136–145)
WBC # BLD AUTO: 6.7 X10*3/UL (ref 4.4–11.3)

## 2024-11-28 PROCEDURE — 81025 URINE PREGNANCY TEST: CPT | Performed by: PHYSICIAN ASSISTANT

## 2024-11-28 PROCEDURE — 85025 COMPLETE CBC W/AUTO DIFF WBC: CPT | Performed by: PHYSICIAN ASSISTANT

## 2024-11-28 PROCEDURE — 99284 EMERGENCY DEPT VISIT MOD MDM: CPT | Mod: 25

## 2024-11-28 PROCEDURE — 36415 COLL VENOUS BLD VENIPUNCTURE: CPT | Performed by: PHYSICIAN ASSISTANT

## 2024-11-28 PROCEDURE — 86901 BLOOD TYPING SEROLOGIC RH(D): CPT | Performed by: PHYSICIAN ASSISTANT

## 2024-11-28 PROCEDURE — 84702 CHORIONIC GONADOTROPIN TEST: CPT | Performed by: PHYSICIAN ASSISTANT

## 2024-11-28 PROCEDURE — 84075 ASSAY ALKALINE PHOSPHATASE: CPT | Performed by: PHYSICIAN ASSISTANT

## 2024-11-28 PROCEDURE — 76856 US EXAM PELVIC COMPLETE: CPT | Performed by: RADIOLOGY

## 2024-11-28 PROCEDURE — 99284 EMERGENCY DEPT VISIT MOD MDM: CPT | Performed by: PHYSICIAN ASSISTANT

## 2024-11-28 PROCEDURE — 85610 PROTHROMBIN TIME: CPT | Performed by: PHYSICIAN ASSISTANT

## 2024-11-28 PROCEDURE — 76856 US EXAM PELVIC COMPLETE: CPT

## 2024-11-28 PROCEDURE — 76830 TRANSVAGINAL US NON-OB: CPT | Performed by: RADIOLOGY

## 2024-11-28 ASSESSMENT — LIFESTYLE VARIABLES
TOTAL SCORE: 0
EVER HAD A DRINK FIRST THING IN THE MORNING TO STEADY YOUR NERVES TO GET RID OF A HANGOVER: NO
HAVE PEOPLE ANNOYED YOU BY CRITICIZING YOUR DRINKING: NO
EVER FELT BAD OR GUILTY ABOUT YOUR DRINKING: NO
HAVE YOU EVER FELT YOU SHOULD CUT DOWN ON YOUR DRINKING: NO

## 2024-11-28 ASSESSMENT — PAIN SCALES - GENERAL: PAINLEVEL_OUTOF10: 0 - NO PAIN

## 2024-11-28 ASSESSMENT — COLUMBIA-SUICIDE SEVERITY RATING SCALE - C-SSRS
2. HAVE YOU ACTUALLY HAD ANY THOUGHTS OF KILLING YOURSELF?: NO
1. IN THE PAST MONTH, HAVE YOU WISHED YOU WERE DEAD OR WISHED YOU COULD GO TO SLEEP AND NOT WAKE UP?: NO
6. HAVE YOU EVER DONE ANYTHING, STARTED TO DO ANYTHING, OR PREPARED TO DO ANYTHING TO END YOUR LIFE?: NO

## 2024-11-28 ASSESSMENT — PAIN - FUNCTIONAL ASSESSMENT: PAIN_FUNCTIONAL_ASSESSMENT: 0-10

## 2024-11-28 NOTE — SIGNIFICANT EVENT
Ultrasound resulted with signs of polyps versus fibroids.  Was able to reach patient at her phone number listed.  Informed her of the results.  She states that the bleeding has significantly decreased.  She feels well and will follow-up with her OB/GYN.  Return precautions were again reiterated.  She verbalized understanding results.

## 2024-11-28 NOTE — ED PROVIDER NOTES
Emergency Department Provider Note        History of Present Illness     43-year-old female otherwise healthy presenting for heavy vaginal bleeding.  States started yesterday evening.  Has soaked through several pads since her early this morning almost 1 hour.  States she has passed large clots largest being size of her palm.  States that she is on the NuvaRing, this is her second month of it.  States she thinks it came out with the bleeding and clots.  She denies any pelvic pain.  Denies any dysuria frequency urgency.  Denies concerns for STDs.  Denies any lightheadedness dizziness syncope near syncope or palpitations.  States that her last monthly cycle came a few weeks early, she was not sure if yesterday it was the beginning of her new cycle.  States that she used to have an IUD however it came out spontaneously x 2.    External Records Reviewed including ED notes, H&P, Discharge Summary, outpatient PCP/specialist notes.  Physical Exam     Triage Vitals: T 36.8 °C (98.2 °F)  HR 87  /76  RR 18  O2 97 % None (Room air)  GEN: NAD  EYES:  EOMs grossly intact, anicteric sclera  JAZMINE: Mucosa moist.  NECK: Supple.  CARD: RRR  PULMONARY: Moving air well. Clear all lung fields.  ABDOMEN: Soft, no guarding, no rigidity. Nontender. NABS  EXTREMITIES: Full ROM, no pitting edema,   SKIN: Intact, warm and dry  NEURO: Alert and oriented x 3, speech is clear, no obvious deficits noted.   : With female nurse as chaperone and assistant; Speculum exam shows no discharge, ulcerations, lesions.  Mild dark red blood in the vaginal vault, no clots.  Closed cervical os. Bimanual exam shows no adnexal tenderness or mass. No cervical motion tenderness.      Medical Decision Making & ED Course     43-year-old female presenting for IV vaginal bleeding since yesterday.  On exam she is well-appearing ambulating comfortably.  Vital signs stable.  ABD soft NTTP with NABS.  Pelvic exam performed with female chaperone showed mild  amount of dark red blood in the vaginal vault, no clots present, Clos cervical os, no adnexal or cervical motion tenderness.  Will check laboratory work, transvaginal ultrasound.    ED Course as of 11/28/24 1138   Thu Nov 28, 2024   0842 HEMOGLOBIN: 13.9  CBC reassuring with no anemia [DELFINO]   0843 Laboratory work reassuring with no leukocytosis or anemia, normal electrolytes, negative pregnancy [DELFINO]   1137 Ultrasound prolonged in resulting with the radiology report.  Patient no longer wanted to wait stating she will follow-up the results on the Cequint evie.  Through joint decision-making, I feel this is reasonable given low concern for any emergent cause of her heavy bleeding that would require hospitalization or other surgical intervention at this time.  She is given return precautions including soaking 2 pads per hour and 3 hours straight or symptoms of acute blood loss.  She is to follow-up with OB/GYN.  She verbalized understanding discharge plan she agreed with plan of questions were answered. [DELFINO]      ED Course User Index  [DELFINO] Hector Dalton PA-C         Diagnoses as of 11/28/24 1138   Abnormal uterine bleeding (AUB)     US PELVIS TRANSABDOMINAL WITH TRANSVAGINAL    (Results Pending)     Labs Reviewed   COMPREHENSIVE METABOLIC PANEL - Abnormal       Result Value    Glucose 89      Sodium 140      Potassium 3.9      Chloride 109 (*)     Bicarbonate 21      Anion Gap 14      Urea Nitrogen 12      Creatinine 0.92      eGFR 79      Calcium 9.7      Albumin 4.2      Alkaline Phosphatase 32 (*)     Total Protein 6.9      AST 18      Bilirubin, Total 1.0      ALT 14     PROTIME-INR - Normal    Protime 10.6      INR 0.9     HUMAN CHORIONIC GONADOTROPIN, SERUM QUANTITATIVE - Normal    HCG, Beta-Quantitative <3      Narrative:     Total HCG measurement is performed using the Siemens AtellRapidMiner immunoassay which detects intact HCG and free beta HCG subunit.  This test is not indicated for use as a tumor marker.  HCG  testing is performed using a different test methodology at [facility] than other [facility]. Direct result comparison  should only be made within the same method.          POCT PREGNANCY, URINE - Normal    Preg Test, Ur Negative     CBC WITH AUTO DIFFERENTIAL    WBC 6.7      nRBC 0.0      RBC 4.55      Hemoglobin 13.9      Hematocrit 40.0      MCV 88      MCH 30.5      MCHC 34.8      RDW 11.9      Platelets 320      Neutrophils % 63.5      Immature Granulocytes %, Automated 0.2      Lymphocytes % 24.7      Monocytes % 5.9      Eosinophils % 4.8      Basophils % 0.9      Neutrophils Absolute 4.23      Immature Granulocytes Absolute, Automated 0.01      Lymphocytes Absolute 1.64      Monocytes Absolute 0.39      Eosinophils Absolute 0.32      Basophils Absolute 0.06     TYPE AND SCREEN    ABO TYPE O      Rh TYPE POS      ANTIBODY SCREEN NEG         ----------------------------------------------------------------------------------------------------------------------------    This note was dictated using a speech recognition program.  While an attempt was made at proof reading to minimize errors, minor errors in transcription may be present call for questions.     Hector Dalton PA-C  11/28/24 [id number]

## 2024-11-28 NOTE — ED TRIAGE NOTES
Patient presents to the ED for vaginal bleeding. Patient states since last night she has been passing large clots. Patient denies any pain, states she felt cramps earlier and passed a clot and has since felt fine. Denies any chance of pregnancy, is on Nuva ring. Patient's period started two days ago and lost the ring, currently does not have it in. Has only used it for two months. Her last menstrual cycle was two weeks ago and lasted for 10 days.  Denies any other complaints.

## 2024-11-30 DIAGNOSIS — Z30.09 GENERAL COUNSELING AND ADVICE FOR CONTRACEPTIVE MANAGEMENT: ICD-10-CM

## 2024-11-30 RX ORDER — ETONOGESTREL AND ETHINYL ESTRADIOL VAGINAL RING .015; .12 MG/D; MG/D
1 RING VAGINAL
Qty: 1 EACH | Refills: 11 | Status: SHIPPED | OUTPATIENT
Start: 2024-11-30 | End: 2025-11-30

## 2024-11-30 NOTE — PROGRESS NOTES
"Pt c/o AUB, nuva ring was missing and subsequently reordered. She proceeded to the ED and an US was performed; left ovarian cyst, multiple fibroids measuring up to 1.3cm and a \"lobulated heterogeneous and predominantly  hypoechoic lesion projected in the fundal endometrial cavity, measuring 2.5 x 2.3 x 1.1 cm, best seen in image 54/98, with suggestion of internal vascular flow on Doppler interrogation.\"  "

## 2024-12-16 NOTE — PROGRESS NOTES
"  Patient ID: Iman Wooten is a 44 y.o. female who presents for No chief complaint on file..    Referring Provider: Francisco Javier Cope  Pt was referred for weight loss     Preferred Pharmacy:    [facility]/pharmacy #[id number] - [facility], [place] - [address]  [address]  [facility] [address]  Phone: [phone] Fax: [phone]    [facility] - [place], [place] - [address]  [address] [address]  Phone: [phone] Fax: [phone]      Subjective    Accidentally removed IUD a \"couple of months ago\" - started progesterone while out, getting a new one placed tomorrow.     While IUD was in, no bleeding.   When IUD came out, returned to having a cycle following 14 days of progesterone.    -Heavy with clots, cramps    Any plans for a pregnancy in the next year: No    Uterus: Yes    Movement:   Joint pain? No- has been on estradiol x ~1 year  Strength training? Jazzercise - weights component to each hour long class, 40% cardio and 20% free weights.   Recommended listening to podcast with Alina White  Recommend 2-3x/week for 20 minutes  Osteopenia or osteoporosis: No    Stress Level (rate 1-10): 7-8 out of 10   7 yo daughter with Autism   Exercise is a stress reliever  Started with [facility] therapist - daughter + weight & body image  Recommend daily 2-5 minutes of mindfulness, meditation, journaling, etc to help reduce cortisol levels.     Depression? Yes, life long on sertraline    Sleep:   Quantity/Quality/Regularity? \"Not great\" - 5-6 hours nightly, wears fit bit sleep quality is \"fair\".   Daytime brain fog/memory troubles? Yes- estradiol helped, but still noticeable.   Tobacco? No  Alcohol? Yes. 1 glass of wine every couple day  Recommended reducing alcohol consumption  THC? No    Patient identified goals:       Onset:   3-4 years ago (COVID work from home)  Nothing else had changed, eating/moving the same, exercising more.   Overall weight gain about 50 " "lbs  Noticing ankles hurting more during jazzercise. 3-4 days/week in studio and 1-2 mornings at home.     Current challenges:   Mental - has been on and off diets entire life  Most consistent weight was 180  Adverse to tracking  Time  Feeling short on space for meal prep/planning    How long implementing diet/lifestyle change for weight loss:   Lifelong    Disordered eating patterns:   None    Weight Loss Drug Therapy Options Screening:     GLP-1 and Metformin:     Pre-Diabetes/Diabetes? No  Lab Results   Component Value Date    INSULFAST 15 08/06/2024    HGBA1C 5.2 08/06/2024       Pertinent PMH Review:   PMH of Pancreatitis: No  PMH of Gallbladder disease: No  PMH of Delayed Gastric Emptying: No  GI issues? GERD  Frequency of BM? Daily  PMH of MTC: No  PMH of Retinopathy: No  PMH of Suicidal Ideation: No    Topiramate:   Migraines? No    Adipex:   Anxiety? Yes    Thyroid health:   Lab Results   Component Value Date    TSH 3.63 08/06/2024        HRT  -Estradiol 1mg oral and IUD    Medications potentially contributing to weight gain:   None    Medications tried/stopped for weight management:    None     Concurrent Chronic Medical Conditions:   Cardiovascular Health  The 10-year ASCVD risk score (David PAYAN, et al., 2019) is: 0.5%    Values used to calculate the score:      Age: 44 years      Sex: Female      Is Non- : No      Diabetic: No      Tobacco smoker: No      Systolic Blood Pressure: 139 mmHg      Is BP treated: No      HDL Cholesterol: 67.8 mg/dL      Total Cholesterol: 174 mg/dL    Lab Results   Component Value Date    CHOL 174 08/06/2024     Lab Results   Component Value Date    HDL 67.8 08/06/2024     Lab Results   Component Value Date    LDLCALC 74 08/06/2024     Lab Results   Component Value Date    TRIG 163 (H) 08/06/2024     No components found for: \"CHOLHDL\"   BP Readings from Last 3 Encounters:   11/28/24 139/76   09/23/24 115/73   07/31/24 118/70      Blood Sugar " "Balance  Lab Results   Component Value Date    GLUCOSE 89 11/28/2024    HGBA1C 5.2 08/06/2024     Lab Results   Component Value Date    INSULFAST 15 08/06/2024         Thyroid  Lab Results   Component Value Date    TSH 3.63 08/06/2024     Iron Status  No results found for: \"IRON\", \"TIBC\", \"FERRITIN\"     Kidney Function  Lab Results   Component Value Date    GFRF >90 05/27/2023    CREATININE 0.92 11/28/2024       Potassium  Lab Results   Component Value Date    K 3.9 11/28/2024        Vitamin D3  Lab Results   Component Value Date    VITD25 39 08/06/2024   Recommended 2000 international units  daily, with a fat containing meal    Current Outpatient Medications on File Prior to Visit   Medication Sig Dispense Refill    cholecalciferol (Vitamin D-3) 25 MCG (1000 UT) capsule Take by mouth.      etonogestreL-ethinyl estradioL (Nuvaring) 0.12-0.015 mg/24 hr vaginal ring Insert 1 each into the vagina every 28 (twenty-eight) days. Insert vaginal ring for 3 weeks, then remove for 1 week. 1 each 11    levothyroxine (Synthroid, Levoxyl) 50 mcg tablet Take 1 tablet (50 mcg) by mouth once daily in the morning. Take on empty stomach 30  to 60 minutes before first meal of the day 90 tablet 3    multivitamin tablet Take by mouth.      polyethylene glycol (GoLYTELY) 236-22.74-6.74 -5.86 gram solution Drink 1/2 starting at 6 pm the night before your procedure then drink the 2nd 1/2 5 hours before procedure arrival time 4000 mL 0    sertraline (Zoloft) 100 mg tablet Take 1.5 tablets (150 mg) by mouth once daily. 135 tablet 3    tirzepatide, weight loss, (Zepbound) 5 mg/0.5 mL solution Inject 5 mg under the skin every 7 days. ICD-10 code: z68.35 2 mL 11     No current facility-administered medications on file prior to visit.        Lifestyle and Nourishment Recommendations  Currently 3 meals daily with a mid morning and mid afternoon snack, sometimes after dinner snack.   Dinner at 8pm on gym days  Not gym days: 6:30pm  Bedtime: " "9:30-10:30pm  Ideally last food 3 hours prior to bedtime.   Encouraged 1/2 of plate colorful vegetables at each meal  Focus on whole foods as close to nature as possible (less than 5 ingredients on the label)  Increase high quality protein to 25-30 grams per meal along with 2-3 x/week resistance training  Discussed [employer] Meats  Patient had questions regarding protein shakes, currently drinking Fairlife protein shakes, recommended discontinuation. Will send recommendation for Whey Protein powder.   Include daily walking (150 min/week) and mind/body activities (meditation, mindfulness, journaling, etc.) for a minimum of 2-5 minutes daily to reduce cortisol levels.   Increase fiber 25-30 grams daily (work up slowly to avoid GI distress)  Goal: Daily bowel movement  Beverages: Focus on water, organic tea (loose leave or in paper, avoid plastic sachets), or sparkling/mineral water (ex. Spindrift, Emir)   Currently drinking 2-3 8pz cups daily. Last 3-4pm.   No more than 1 cup (8oz) of organic coffee daily prior to noon. May consider organic green tea.   Avoid alcohol   Avoid ALL artificial sweeteners   Prioritize 7-8 hours of restful sleep nightly.   Turn off electronics 1 hour prior to bedtime, no electronics in the bedroom.  Establish a regular sleep/wake time (+/- 30 minutes)     Medication and allergy reconciliation completed     Drug Interactions   No significant drug interactions identified    Assessment/Plan   Patients goals:   \"To be less than 200 lbs\"   Discussed patients history, current medical conditions, medications, as well as current diet and lifestyle in depth.   Patient has been making diet/lifestyle changes for: >10 years  BMI: 35  MARLENI IR: 3.4 (>2 indicates potential insulin resistance  Concurrent medical conditions: hypothyroidism.   Levothyroxine was increased to 50 mcg, tolerating well.   Discussed elevated MARLENI-IR  Iman took metformin x 30 days at 500mg XL and x 30 days at 1000mg XL and " has not noticed much benefit, discontinued and switched to zepbound vials.   Zepbound 5mg  Lost 8 lbs since starting  Less hungry overall, comes in waves, some days not hungry at all until dinner, other days normally hungry.    Reflux/heartburn every few days at night  Will start paying attention to triggering foods  Recommend no food 2-3 hours prior to bedtime  Jazzercize 3 days/week  Recommend an additional 20 minutes 2x/week of strength  Would like to stay at current dose due to traveling and upcoming procedure.   Discussed ok to take dose 2 days late (Monday) so that she doesn't have to worry about traveling with the Zepbound.   Patient has uterine fibroid  Has outpatient surgery coming up in January  Saw Dr. Aguilar who stopped Nuvaring, and started Jany to help control symptoms until fibroids are removed.   During procedure plan is to put in a new IUD    CONTINUE  Zepbound 5mg once weekly (Saturday)    Follow-up: 1/23/25 4pm     Time spent with pt: Total length of time 20 (minutes) of the encounter and more than 50% was spent counseling the patient.    Carmina Elizalde, Pharm.D, [facility], [facility]  Clinical Pharmacist  Pharmacy Services  [phone]    Continue all meds under the continuation of care with the referring provider and clinical pharmacy team.    Verbal consent to manage patient's drug therapy was obtained from the patient and/or an individual authorized to act on behalf of a patient. They were informed they may decline to participate or withdraw from participation in pharmacy services at any time.

## 2024-12-18 ENCOUNTER — APPOINTMENT (OUTPATIENT)
Dept: OBSTETRICS AND GYNECOLOGY | Facility: CLINIC | Age: 44
End: 2024-12-18
Payer: COMMERCIAL

## 2024-12-18 VITALS
BODY MASS INDEX: 32.77 KG/M2 | DIASTOLIC BLOOD PRESSURE: 74 MMHG | SYSTOLIC BLOOD PRESSURE: 130 MMHG | WEIGHT: 216.2 LBS | HEIGHT: 68 IN

## 2024-12-18 DIAGNOSIS — N93.9 ABNORMAL UTERINE BLEEDING (AUB): Primary | ICD-10-CM

## 2024-12-18 DIAGNOSIS — D21.9 FIBROIDS: ICD-10-CM

## 2024-12-18 LAB — HCG UR QL IA.RAPID: NEGATIVE

## 2024-12-18 PROCEDURE — 81025 URINE PREGNANCY TEST: CPT

## 2024-12-18 PROCEDURE — 3008F BODY MASS INDEX DOCD: CPT | Performed by: OBSTETRICS & GYNECOLOGY

## 2024-12-18 PROCEDURE — 99214 OFFICE O/P EST MOD 30 MIN: CPT | Performed by: OBSTETRICS & GYNECOLOGY

## 2024-12-18 RX ORDER — DROSPIRENONE AND ETHINYL ESTRADIOL 0.03MG-3MG
1 KIT ORAL DAILY
Qty: 84 TABLET | Refills: 3 | Status: SHIPPED | OUTPATIENT
Start: 2024-12-18 | End: 2025-12-18

## 2024-12-18 ASSESSMENT — ENCOUNTER SYMPTOMS
CARDIOVASCULAR NEGATIVE: 0
MUSCULOSKELETAL NEGATIVE: 0
EYES NEGATIVE: 0
CONSTITUTIONAL NEGATIVE: 0
HEMATOLOGIC/LYMPHATIC NEGATIVE: 0
GASTROINTESTINAL NEGATIVE: 0
ENDOCRINE NEGATIVE: 0
PSYCHIATRIC NEGATIVE: 0
ALLERGIC/IMMUNOLOGIC NEGATIVE: 0
NEUROLOGICAL NEGATIVE: 0
RESPIRATORY NEGATIVE: 0

## 2024-12-18 ASSESSMENT — PAIN SCALES - GENERAL: PAINLEVEL_OUTOF10: 0-NO PAIN

## 2024-12-18 NOTE — PATIENT INSTRUCTIONS
Thanks for coming in today for follow-up.     Review the information about fibroids, D&C with hysteroscopy.      A repeat blood count and labs are being sent today to help evaluate your abnormal bleeding.  Results should be available in the next 24 hours.  You may call the office and select option #2 to speak with the nurse to obtain the results.    Return to the office for preoperative visit.      A prescription for a birth control pills been sent to the pharmacy.  You may start the pill today.  Remember to take the pill at the same time daily.  Hopefully this will help prevent any heavy vaginal bleeding until your D&C with hysteroscopy and Mirena IUD placement can occur.      Feel free to call the office with any problems, questions or concerns prior to next scheduled visit.      Please call the office and or go to the nearest emergency department with any heavy vaginal bleeding-bleeding greater than a pad an hour.

## 2024-12-18 NOTE — PROGRESS NOTES
Assessment and Plan:  Iman Wooten is a 45 y/o woman presenting today for abnormal uterine bleeding.     Diagnoses:  #1 AUB  #2 Fibroids    Assessment/Plan:  1. Abnormal uterine bleeding with fibroids and possible endometrial fibroid or polyp noted on recent ultrasound  - Discussed with patient recent ultrasound.  - Information about fibroids, D&C and hysteroscopy, and the Mirena IUD provided.  - Patient not at risk for taking combination oral contraceptive pills.  - Prescription for Jany sent today, patient to start today to hopefully stave off her heavy bleeding until a D&C with hysteroscopy can be performed.  - Discussed with patient the risks, benefits, and alternatives of combination oral contraceptive pills including DVT risk.  - We will recheck a blood count today to screen for anemia, although patient is asymptomatic.  - We will check an FSH and a TSH as the patient does have thyroid disease.  - A UPT is negative today.  - Return to the office in the next few weeks to schedule DNC with hysteroscopy and review recent labs.    Follow up with Dr. Aguilar.    Bal Attestation  By signing my name below, I, Bal Butterfield, attest that this documentation has been prepared under the direction and in the presence of Patricia Aguilar MD on 12/18/2024 at 4:00 PM.     HPI:   Iman Wooten is a 45 y/o woman presenting today for abnormal uterine bleeding. She had an IUD placed in 2021 but it was removed October 2023 while removing a tampon. She had a new IUD placed in April 2024. She has recently tried the NuvaRing but has had extremely heavy periods with clots, lasting 12-14 days at a time. She was seen at the ED due to this abnormal bleeding. Her last menses started on 11/26/24 and ended on 12/12/24. She is going out of town on 12/22/24 and returning on the 12/30/24.    She works at an [employer].    Past medical hx: Anxiety and depression managed with Sertraline. Hypothyroidism managed with  Synthroid. She is also taking Zepbound to help with weight loss. No hx of DVTs, HTN, or migraines with auras.     Surgical hx: Odontectomy.    Social hx: No smoking, vaping, or drug use. Drinks alcohol socially. Currently .    GYN hx: M11y/o, Q monthly lasting 5-7 days. No hx of STIs. No hx of abnormal pap smears. Currently SA. No hx of sexual abuse or rape.    OB hx: .  x1. EAB x1. No PEC, PTL, GDM.     ROS:  Review of Systems   Genitourinary:  Positive for vaginal bleeding.     Physical Exam:   Physical Exam  Constitutional:       General: She is not in acute distress.     Appearance: Normal appearance. She is normal weight.   Neurological:      Mental Status: She is alert and oriented to person, place, and time.      Comments: Pleasant and cooperative

## 2024-12-19 ENCOUNTER — APPOINTMENT (OUTPATIENT)
Dept: PHARMACY | Facility: HOSPITAL | Age: 44
End: 2024-12-19
Payer: COMMERCIAL

## 2024-12-19 DIAGNOSIS — K21.00 GASTROESOPHAGEAL REFLUX DISEASE WITH ESOPHAGITIS, UNSPECIFIED WHETHER HEMORRHAGE: ICD-10-CM

## 2024-12-19 DIAGNOSIS — R63.4 WEIGHT LOSS: ICD-10-CM

## 2024-12-19 DIAGNOSIS — E03.9 HYPOTHYROIDISM, UNSPECIFIED TYPE: ICD-10-CM

## 2024-12-20 ENCOUNTER — LAB (OUTPATIENT)
Dept: LAB | Facility: LAB | Age: 44
End: 2024-12-20
Payer: COMMERCIAL

## 2024-12-20 DIAGNOSIS — N93.9 ABNORMAL UTERINE BLEEDING (AUB): ICD-10-CM

## 2024-12-20 LAB
ERYTHROCYTE [DISTWIDTH] IN BLOOD BY AUTOMATED COUNT: 11.6 % (ref 11.5–14.5)
FSH SERPL-ACNC: 3 IU/L
HCT VFR BLD AUTO: 41.6 % (ref 36–46)
HGB BLD-MCNC: 13.8 G/DL (ref 12–16)
MCH RBC QN AUTO: 31 PG (ref 26–34)
MCHC RBC AUTO-ENTMCNC: 33.2 G/DL (ref 32–36)
MCV RBC AUTO: 94 FL (ref 80–100)
NRBC BLD-RTO: 0 /100 WBCS (ref 0–0)
PLATELET # BLD AUTO: 334 X10*3/UL (ref 150–450)
RBC # BLD AUTO: 4.45 X10*6/UL (ref 4–5.2)
REFLEX ADDED, ANEMIA PANEL: NORMAL
TSH SERPL-ACNC: 1.24 MIU/L (ref 0.44–3.98)
WBC # BLD AUTO: 6.9 X10*3/UL (ref 4.4–11.3)

## 2024-12-20 PROCEDURE — 85027 COMPLETE CBC AUTOMATED: CPT

## 2024-12-20 PROCEDURE — 36415 COLL VENOUS BLD VENIPUNCTURE: CPT

## 2024-12-20 PROCEDURE — 84443 ASSAY THYROID STIM HORMONE: CPT

## 2024-12-20 PROCEDURE — 83001 ASSAY OF GONADOTROPIN (FSH): CPT

## 2024-12-26 ENCOUNTER — TELEPHONE (OUTPATIENT)
Dept: OBSTETRICS AND GYNECOLOGY | Facility: CLINIC | Age: 44
End: 2024-12-26
Payer: COMMERCIAL

## 2024-12-26 NOTE — TELEPHONE ENCOUNTER
Called pt, no answer, left voicemail for return call if needed  Message left regarding normal bloodwork results    ----- Message from Patricia Aguilar sent at 12/23/2024 12:34 AM EST -----  Normal CBC and FSH.

## 2025-01-02 ENCOUNTER — APPOINTMENT (OUTPATIENT)
Dept: OBSTETRICS AND GYNECOLOGY | Facility: CLINIC | Age: 45
End: 2025-01-02
Payer: COMMERCIAL

## 2025-01-02 VITALS
DIASTOLIC BLOOD PRESSURE: 68 MMHG | WEIGHT: 217 LBS | SYSTOLIC BLOOD PRESSURE: 118 MMHG | HEIGHT: 68 IN | BODY MASS INDEX: 32.89 KG/M2

## 2025-01-02 DIAGNOSIS — N93.9 ABNORMAL UTERINE BLEEDING (AUB): Primary | ICD-10-CM

## 2025-01-02 DIAGNOSIS — D25.0 SUBMUCOUS LEIOMYOMA OF UTERUS: ICD-10-CM

## 2025-01-02 PROCEDURE — 3008F BODY MASS INDEX DOCD: CPT | Performed by: STUDENT IN AN ORGANIZED HEALTH CARE EDUCATION/TRAINING PROGRAM

## 2025-01-02 PROCEDURE — 99214 OFFICE O/P EST MOD 30 MIN: CPT | Performed by: STUDENT IN AN ORGANIZED HEALTH CARE EDUCATION/TRAINING PROGRAM

## 2025-01-02 PROCEDURE — 1036F TOBACCO NON-USER: CPT | Performed by: STUDENT IN AN ORGANIZED HEALTH CARE EDUCATION/TRAINING PROGRAM

## 2025-01-02 RX ORDER — DESONIDE 0.5 MG/G
CREAM TOPICAL
COMMUNITY

## 2025-01-02 RX ORDER — ESTRADIOL 1 MG/1
TABLET ORAL
COMMUNITY
Start: 2024-11-30

## 2025-01-02 ASSESSMENT — ENCOUNTER SYMPTOMS
HEMATOLOGIC/LYMPHATIC NEGATIVE: 0
ENDOCRINE NEGATIVE: 0
PSYCHIATRIC NEGATIVE: 0
MUSCULOSKELETAL NEGATIVE: 0
EYES NEGATIVE: 0
CONSTITUTIONAL NEGATIVE: 0
ALLERGIC/IMMUNOLOGIC NEGATIVE: 0
NEUROLOGICAL NEGATIVE: 0
RESPIRATORY NEGATIVE: 0
GASTROINTESTINAL NEGATIVE: 0
CARDIOVASCULAR NEGATIVE: 0

## 2025-01-02 ASSESSMENT — PAIN SCALES - GENERAL: PAINLEVEL_OUTOF10: 0-NO PAIN

## 2025-01-02 NOTE — PROGRESS NOTES
Subjective   Patient ID: Iman Wooten is a 44 y.o. female who presents for Pre-op Exam, Vaginal Bleeding, and Fibroids.  HPI    43 yo  here for pre op consultation for AUB. Referred by Dr. Aguilar.    Bleeding History:  Got IUD 3-4 years ago, was her first IUD. Worked really well for 3 years, and then over summer it was expelled. Had it replaced and it lasted 6 weeks and was expelled again. While she had IUD, she had years of minimal bleeding. With expulsion, bleeding got really heavy, cramping and heavier clots. Cannot really recall menses prior to IUD, was on the pill for years. Doesn't remember having super heavy menses.     Was started on NuvaRing after 2nd IUD expulsion, and had few weeks of constant bleeding. Week of thanksgiving, got really heavy, palm sized clots for few hours. She went to the ED and had a TVUS at this time.    Started on OCPs last month, still heavy but more manageable. Using double protection currently. LMP Started 3-4 days ago.     CBC: 13.8 2024    Pertinent imagin2024 TVUS, reviewed- multi fibroid uterus, lobulated likely submucosal fibroid at the fundus (2.5 x 2.3 x 1.1), simple L cyst    ObHx: ,  x1  GynHx: no h/o STIs, sexually active, on OCPs since seeing Dr Aguilar, h/o D+C postpartum. Last pap 2024 NILM.  PMH: depression, anxiety  PSH: wisdom teeth   Meds: sertraline  FamHx: maternal grandmother- breast ca dx in 40s, maternal cousin- breast ca 30s  CV disease in family  Shx: works at art [employer]  Social etoh  No tobacco use      Objective   Gen: well appearing, no distress  HEENT: normocephalic   Pulm: normal work of breathing on room air  CV: warm, well perfused  GI: abdomen soft, non tender, non distended  : Normal vulva  Bimanual noteable for retroverted uterus, slightly enlarged  Cervix Mid position  Normal vaginal mucosa  MSK: normal range of motion  Ext: no LE edema    Assessment/Plan   43 yo  here for pre op consultation for AUB.  Referred by Dr. Aguilar.  - Plan for operative hysteroscopy, dilation and curettage, IUD placement  - Reviewed risks of surgery, including infection, bleeding, damage to other structures, uterine perforation  - Reviewed benefits and alternatives to surgery at this time. Desires management of fibroid/polyp with surgery at this time  - Images reviewed independently and with patient- discussed likely fibroid at fundus, possible polyp, appears submucosal. Discussed role of pathological evaluation of samples taken.  - Bleeding precautions reviewed, discussed if continues to heave heavy bleeding can attempt medication adjustment   - Consents signed today       Vika Cesar MD 01/02/25 10:39 AM

## 2025-01-07 PROBLEM — D25.0 SUBMUCOUS LEIOMYOMA OF UTERUS: Status: ACTIVE | Noted: 2025-01-02

## 2025-01-07 PROBLEM — N93.9 ABNORMAL UTERINE BLEEDING (AUB): Status: ACTIVE | Noted: 2025-01-02

## 2025-01-08 ENCOUNTER — PATIENT MESSAGE (OUTPATIENT)
Dept: OBSTETRICS AND GYNECOLOGY | Facility: CLINIC | Age: 45
End: 2025-01-08
Payer: COMMERCIAL

## 2025-01-08 DIAGNOSIS — Z01.812 PRE-OPERATIVE LABORATORY EXAMINATION: Primary | ICD-10-CM

## 2025-01-08 NOTE — PREPROCEDURE INSTRUCTIONS
Pre-Operative Instructions &?Checklist      Your surgery has been scheduled at [facility] at [address] [place], [place], [address], [address], in the [facility]. Parking is to the left of the main entrance.     You will be contacted about the time of your surgery the day before your surgery (if your surgery is on a Monday, you will be called the Friday before surgery). If you are unable to answer the phone, a detailed voicemail message will be left. Make sure that your voicemail box is not full so a message can be left. If you have not received a call by 3:00 pm you may call [phone] between the hours of 3:00 and 4:00 pm. Please be available by phone the night before/day of surgery in case there is a change in the schedule which may require you to arrive earlier/later.     ?     14 DAYS BEFORE SURGERY STOP TAKING WEIGHT LOSS MEDICATIONS      ?7 DAYS BEFORE SURGERY STOP THESE MEDICATIONS:       *Multiple Vitamins containing Vitamin E       * Herbal supplements, Fish Oil, garlic pills, turmeric, CoQ enzyme       *Stop taking aspirin, aspirin-containing products, and NSAID's like Advil, Motrin, Aleve, Ibuprofen, Meloxicam, Celecoxib, and Diclofenac.. Tylenol is okay to take for pain relief.        *If you are currently taking Coumadin/Warfarin, we will have to coordinate that with your PCP &/or the Anticoagulation Clinic.     Patients taking Zepbound are asked to stop this medication 7 days before surgery, therefore, skip your Sunday dose scheduled for 01/12/2025.  This medication may be resumed the Sunday after your surgery.        THE DAY BEFORE SURGERY:       *Do not eat any food after midnight the night before surgery.        *You are permitted to have no more than 4 ounces of clear liquids such as water, apple juice, plain tea or coffee (no milk or creamer), clear electrolyte-replenishing         drinks such as Pedialyte, Gatorade, or Powerade  (not yogurt or pulp-containing  smoothies or juices such as orange juice) up to 3 hours before your arrival time.     DAY OF SURGERY TAKE THESE MEDICATIONS (if it is not listed, do not take it.)    Take: Levothyroxine  If taking medications in tablet/capsule form take with a small sip of water.     ON THE MORNING OF SURGERY:       *Shower either the night before your surgery or the morning of your surgery       *Do not use moisturizers, creams, lotions or perfume, or make-up.       *Wear comfortable, loose fitting clothing.        *All jewelry and valuables should be left at home.       *Prosthetic devices such as contact lenses, hearing aids, dentures, eyelash extensions, hairpins and body piercings must be removed before surgery. Bring         containers for eyeglasses/contacts, dentures, or hearing aids with you.     ? Diabetics: Please check fasting blood sugars upon waking up. ?If fasting blood sugars are <80ml/dl, please drink 100ml/3oz. of apple juice no later than 2 hours prior                    to surgery.     ?BRING WITH YOU:        *Photo ID and insurance card       *Current list of medicines and allergies       *Pacemaker/Defibrillator/Heart stent cards       *Copy of your complete Advanced Directive/DHPOA, or proof of guardianship-if applicable     ?SMOKING:       *Quitting smoking can make a huge difference to your health and recovery from surgery. ?       *If you need help with quitting, call [phone]-QUIT-NOW.       ALCOHOL:       *No alcoholic beverages for 48 hours before surgery.     ?AFTER OUTPATIENT SURGERY:       *A responsible adult MUST accompany you at the time of discharge and stay with you for 24 hours after your surgery.       *You may NOT drive yourself home after surgery.       *You may use a taxi or ride sharing service ([employer], Uber) to return home ONLY if you are accompanied by a friend or family member       *Instructions for resuming your medications will be provided by your surgeon.     CONTACT SURGEON'S OFFICE IF  YOU DEVELOP:       *Fever =/>?100.4 F        *New respiratory symptoms (e.g. cough, shortness of breath, respiratory distress, sore throat)       *Recent loss of taste or smell       *Flu like symptoms such as headache, fatigue or gastrointestinal symptoms       *If you develop any open sores, shingles, burning or painful urination    AND/OR:       *You no longer wish to have the surgery.       *Any other personal circumstances change that may lead to the need to cancel or defer this surgery.       *You were admitted to any hospital within one week of your planned procedure.     ?If you have any questions regarding these preoperative instructions, you may call [phone]. If you have questions regarding you surgical procedure, or post-operative care/recovery please call your surgeon's office.     Link to [facility] Laboratory Services Locations   [email or web address]     Link to [facility]   [email or web address]

## 2025-01-08 NOTE — CPM/PAT H&P
CPM/PAT Evaluation       Name: Iman Wooten   /Age: 1980       TELEMEDICINE ENCOUNTER  Patient was interviewed by telephone for preadmission testing perioperative risk assessment prior to surgery.    DATE OF CONSULT: 2025  REFERRING PROVIDER: Dr. Cesar  SURGERY, DATE, AND LENGTH: Hysteroscopy; 2025; 75 minutes    CHIEF COMPLAINT  Abnormal uterine bleeding, submucous leiomyoma of uterus    HPI  Iman Wooten is a 44-year-old female with complaints of long history of heavy vaginal bleeding.  She had her first IUD placed about 4 years ago, that worked well providing her years of minimal bleeding until it was expelled this past summer ().  It was replaced and lasted 6 weeks before it was expelled again.  After expulsion her bleeding became very heavy passing large clots, and cramping.  She was placed on NuvaRing after her second IUD expulsion and experienced several weeks of constant bleeding passing large clots.  She went to the ED and had TVUS that showed a multifibroid uterus, lobulated likely submucosal fibroid at the fundus (2.5 x 2.3 x 1.1), simple L cyst.  Patient referred to preadmission testing in anticipation of hysteroscopy.  Patient has no other medical complaints at this time, and reports to be in usual state of health without any recent illnesses, hospitalizations, and no current or remote infections/fevers.    ACTIVE PROBLEMS  Patient Active Problem List   Diagnosis    Chondromalacia of patella    HERO (generalized anxiety disorder)    GERD (gastroesophageal reflux disease)    Hypothyroidism    Recurrent major depressive disorder, in full remission ([facility])    Rosacea    Back pain    Abnormal uterine bleeding (AUB)    Submucous leiomyoma of uterus        PAST MEDICAL HISTORY  Past Medical History:   Diagnosis Date    Acute candidiasis of vulva and vagina 2015    Yeast vaginitis    Encounter for supervision of other normal pregnancy, second trimester 2015     Prenatal care, subsequent pregnancy, second trimester    Long term (current) use of antibiotics 2014    Prophylactic antibiotic    Major depressive disorder, recurrent, mild (CMS-HCC) 2017    Depression, major, recurrent, mild    Maternal care for (suspected) hereditary disease in fetus, not applicable or unspecified ([facility]) 2014    Hereditary disease in family possibly affecting fetus, affecting management of mother, antepartum condition or complication    Other acute postprocedural pain 2014    Post-operative pain    Other conditions influencing health status 2015    History of pregnancy    Other specified anxiety disorders 2019    Other specified anxiety disorders    Overweight 2018    Overweight (BMI 25.0-29.9)    Pain in unspecified knee 2018    Joint pain, knee    Personal history of other mental and behavioral disorders     History of depression    Personal history of other mental and behavioral disorders 2018    History of anxiety    Personal history of other mental and behavioral disorders 2015    History of depression    Personal history of other mental and behavioral disorders 2019    History of anxiety disorder    Supervision of elderly multigravida, unspecified trimester ([facility]) 2015    AMA (advanced maternal age) multigravida 35+    Supervision of elderly primigravida, unspecified trimester ([facility]) 2014    Antepartum elder primigravida    Unspecified abnormal findings on  screening of mother 2014    Abnormal finding on  screen        SURGICAL HISTORY  Past Surgical History:   Procedure Laterality Date    DILATION AND CURETTAGE OF UTERUS  06/15/2015    Dilation And Curettage    MOUTH SURGERY  2015    Oral Surgery Tooth Extraction in childhood        ANESTHESIA HISTORY  Denies problems with anesthesia in the past such as PONV, prolonged sedation, awareness, dental damage, aspiration, cardiac  arrest, difficult intubation, or unexpected hospital admissions. Denies family history of malignant hyperthermia, or pseudocholinesterase deficiency.     SOCIAL HISTORY  Never smoker; occasional alcohol use; no recreational drug use.  Patient states she exercises 3 times a week and is able to do moderate ADLs such as heavy housework, light yard.  She denies chest pain or shortness of breath.  METS 4 negative for HOLLY    FAMILY HISTORY  Family History   Problem Relation Name Age of Onset    Anxiety disorder Mother      Depression Mother      Macular degeneration Mother      Ulcers Mother      Anxiety disorder Father      Coronary artery disease Father          in native artery    Depression Father      Hyperlipidemia Father      Hypertension Father      Autism Sister      Learning disabilities Sister      Autism Daughter      Anxiety disorder Mother's Sister      Depression Mother's Sister      Anxiety disorder Father's Sister      Depression Father's Sister      Diabetes type I Father's Brother      Breast cancer Maternal Grandmother      Anxiety disorder Maternal Grandmother      Depression Maternal Grandmother      Anxiety disorder Maternal Grandfather      Depression Maternal Grandfather      Anxiety disorder Paternal Grandmother      Depression Paternal Grandmother      Anxiety disorder Paternal Grandfather      Depression Paternal Grandfather      Other (acute gastritis) Other Unknown     Bone cancer Other Unknown     Colon cancer Other Unknown     Other (connective tissue defect) Other Unknown     Ovarian cancer Other Unknown     Other (thyroid) Other Unknown         ALLERGIES  No Known Allergies     MEDICATIONS  No current facility-administered medications for this encounter.    Current Outpatient Medications:     cholecalciferol (Vitamin D-3) 25 MCG (1000 UT) capsule, Take by mouth., Disp: , Rfl:     drospirenone-ethinyl estradioL (Jany, 28,) 3-0.03 mg tablet, Take 1 tablet by mouth once daily., Disp: 84  tablet, Rfl: 3    levothyroxine (Synthroid, Levoxyl) 50 mcg tablet, Take 1 tablet (50 mcg) by mouth once daily in the morning. Take on empty stomach 30  to 60 minutes before first meal of the day, Disp: 90 tablet, Rfl: 3    multivitamin tablet, Take by mouth., Disp: , Rfl:     sertraline (Zoloft) 100 mg tablet, Take 1.5 tablets (150 mg) by mouth once daily., Disp: 135 tablet, Rfl: 3    tirzepatide, weight loss, (Zepbound) 5 mg/0.5 mL solution, Inject 5 mg under the skin every 7 days. ICD-10 code: z68.35, Disp: 2 mL, Rfl: 11    desonide (DesOwen) 0.05 % cream, APPLY THIN COAT TO AFFECTED AREA TWICE A DAY, Disp: , Rfl:     estradiol (Estrace) 1 mg tablet, , Disp: , Rfl:     polyethylene glycol (GoLYTELY) 236-22.74-6.74 -5.86 gram solution, Drink 1/2 starting at 6 pm the night before your procedure then drink the 2nd 1/2 5 hours before procedure arrival time, Disp: 4000 mL, Rfl: 0     REVIEW OF SYSTEMS  Review of Systems   Genitourinary:         Abnormal uterine bleeding   All other systems reviewed and are negative.    STOP BANG:  Negative for HOLLY    PHYSICAL EXAM  Deferred    AIRWAY EXAM  Deferred    VITALS  No vitals taken for telemedicine visit  BMI Readings from Last 1 Encounters:   01/02/25 32.99 kg/m²      BP Readings from Last 4 Encounters:   01/02/25 118/68   12/18/24 130/74   11/28/24 139/76   09/23/24 115/73        LABS  Lab Results   Component Value Date    WBC 6.9 12/20/2024    HGB 13.8 12/20/2024    HCT 41.6 12/20/2024    MCV 94 12/20/2024     12/20/2024      Lab Results   Component Value Date    GLUCOSE 89 11/28/2024    CALCIUM 9.7 11/28/2024     11/28/2024    K 3.9 11/28/2024    CO2 21 11/28/2024     (H) 11/28/2024    BUN 12 11/28/2024    CREATININE 0.92 11/28/2024      Lab Results   Component Value Date    HGBA1C 5.2 08/06/2024      Lab Results   Component Value Date    CHOL 174 08/06/2024    CHOL 181 05/27/2023    CHOL 162 05/28/2022     Lab Results   Component Value Date    HDL  "67.8 08/06/2024    HDL 63.9 05/27/2023    HDL 59.2 05/28/2022     Lab Results   Component Value Date    LDLCALC 74 08/06/2024     Lab Results   Component Value Date    TRIG 163 (H) 08/06/2024    TRIG 98 05/27/2023    TRIG 79 05/28/2022     No components found for: \"CHOLHDL\"           ASSESSMENT/PLAN  Abnormal uterine bleeding, submucous leiomyoma of uterus  Hysteroscopy        Preoperative instructions reviewed in detail with patient during this encounter. A copy of these instructions has been unloaded to  [employer] along with a copy sent to either home email address or mailed to home address.  This note was created in part upon personal review of patient's medical records.  Speech recognition transcription software was used in the creation of this note. Despite proofreading, several typographical errors might be present that might affect the meaning of the content.       "

## 2025-01-12 ENCOUNTER — ANESTHESIA EVENT (OUTPATIENT)
Dept: OPERATING ROOM | Facility: CLINIC | Age: 45
End: 2025-01-12
Payer: COMMERCIAL

## 2025-01-13 ENCOUNTER — LAB (OUTPATIENT)
Dept: LAB | Facility: LAB | Age: 45
End: 2025-01-13
Payer: COMMERCIAL

## 2025-01-13 DIAGNOSIS — Z01.812 PRE-OPERATIVE LABORATORY EXAMINATION: ICD-10-CM

## 2025-01-13 LAB
ERYTHROCYTE [DISTWIDTH] IN BLOOD BY AUTOMATED COUNT: 11.9 % (ref 11.5–14.5)
HCT VFR BLD AUTO: 39.8 % (ref 36–46)
HGB BLD-MCNC: 13.2 G/DL (ref 12–16)
MCH RBC QN AUTO: 30.5 PG (ref 26–34)
MCHC RBC AUTO-ENTMCNC: 33.2 G/DL (ref 32–36)
MCV RBC AUTO: 92 FL (ref 80–100)
NRBC BLD-RTO: 0 /100 WBCS (ref 0–0)
PLATELET # BLD AUTO: 389 X10*3/UL (ref 150–450)
RBC # BLD AUTO: 4.33 X10*6/UL (ref 4–5.2)
WBC # BLD AUTO: 7.4 X10*3/UL (ref 4.4–11.3)

## 2025-01-13 PROCEDURE — 85027 COMPLETE CBC AUTOMATED: CPT

## 2025-01-14 ENCOUNTER — HOSPITAL ENCOUNTER (OUTPATIENT)
Facility: CLINIC | Age: 45
Setting detail: OUTPATIENT SURGERY
Discharge: HOME | End: 2025-01-14
Attending: STUDENT IN AN ORGANIZED HEALTH CARE EDUCATION/TRAINING PROGRAM | Admitting: STUDENT IN AN ORGANIZED HEALTH CARE EDUCATION/TRAINING PROGRAM
Payer: COMMERCIAL

## 2025-01-14 ENCOUNTER — ANESTHESIA (OUTPATIENT)
Dept: OPERATING ROOM | Facility: CLINIC | Age: 45
End: 2025-01-14
Payer: COMMERCIAL

## 2025-01-14 VITALS
TEMPERATURE: 97.5 F | HEART RATE: 59 BPM | OXYGEN SATURATION: 100 % | RESPIRATION RATE: 16 BRPM | SYSTOLIC BLOOD PRESSURE: 106 MMHG | HEIGHT: 68 IN | DIASTOLIC BLOOD PRESSURE: 61 MMHG | BODY MASS INDEX: 31.81 KG/M2 | WEIGHT: 209.88 LBS

## 2025-01-14 DIAGNOSIS — N93.9 ABNORMAL UTERINE BLEEDING (AUB): ICD-10-CM

## 2025-01-14 DIAGNOSIS — Z98.890 STATUS POST SURGERY: Primary | ICD-10-CM

## 2025-01-14 DIAGNOSIS — D25.0 SUBMUCOUS LEIOMYOMA OF UTERUS: ICD-10-CM

## 2025-01-14 LAB — PREGNANCY TEST URINE, POC: NEGATIVE

## 2025-01-14 PROCEDURE — 3600000008 HC OR TIME - EACH INCREMENTAL 1 MINUTE - PROCEDURE LEVEL THREE: Performed by: STUDENT IN AN ORGANIZED HEALTH CARE EDUCATION/TRAINING PROGRAM

## 2025-01-14 PROCEDURE — 7100000010 HC PHASE TWO TIME - EACH INCREMENTAL 1 MINUTE: Performed by: STUDENT IN AN ORGANIZED HEALTH CARE EDUCATION/TRAINING PROGRAM

## 2025-01-14 PROCEDURE — 88305 TISSUE EXAM BY PATHOLOGIST: CPT | Mod: TC,SUR | Performed by: STUDENT IN AN ORGANIZED HEALTH CARE EDUCATION/TRAINING PROGRAM

## 2025-01-14 PROCEDURE — 7100000009 HC PHASE TWO TIME - INITIAL BASE CHARGE: Performed by: STUDENT IN AN ORGANIZED HEALTH CARE EDUCATION/TRAINING PROGRAM

## 2025-01-14 PROCEDURE — 2500000001 HC RX 250 WO HCPCS SELF ADMINISTERED DRUGS (ALT 637 FOR MEDICARE OP): Performed by: STUDENT IN AN ORGANIZED HEALTH CARE EDUCATION/TRAINING PROGRAM

## 2025-01-14 PROCEDURE — 2500000001 HC RX 250 WO HCPCS SELF ADMINISTERED DRUGS (ALT 637 FOR MEDICARE OP): Performed by: ANESTHESIOLOGY

## 2025-01-14 PROCEDURE — 2500000004 HC RX 250 GENERAL PHARMACY W/ HCPCS (ALT 636 FOR OP/ED): Performed by: STUDENT IN AN ORGANIZED HEALTH CARE EDUCATION/TRAINING PROGRAM

## 2025-01-14 PROCEDURE — 3700000002 HC GENERAL ANESTHESIA TIME - EACH INCREMENTAL 1 MINUTE: Performed by: STUDENT IN AN ORGANIZED HEALTH CARE EDUCATION/TRAINING PROGRAM

## 2025-01-14 PROCEDURE — 3700000001 HC GENERAL ANESTHESIA TIME - INITIAL BASE CHARGE: Performed by: STUDENT IN AN ORGANIZED HEALTH CARE EDUCATION/TRAINING PROGRAM

## 2025-01-14 PROCEDURE — 58300 INSERT INTRAUTERINE DEVICE: CPT | Performed by: STUDENT IN AN ORGANIZED HEALTH CARE EDUCATION/TRAINING PROGRAM

## 2025-01-14 PROCEDURE — 2500000004 HC RX 250 GENERAL PHARMACY W/ HCPCS (ALT 636 FOR OP/ED): Performed by: NURSE ANESTHETIST, CERTIFIED REGISTERED

## 2025-01-14 PROCEDURE — 58561 HYSTEROSCOPY REMOVE MYOMA: CPT | Performed by: STUDENT IN AN ORGANIZED HEALTH CARE EDUCATION/TRAINING PROGRAM

## 2025-01-14 PROCEDURE — 3600000003 HC OR TIME - INITIAL BASE CHARGE - PROCEDURE LEVEL THREE: Performed by: STUDENT IN AN ORGANIZED HEALTH CARE EDUCATION/TRAINING PROGRAM

## 2025-01-14 PROCEDURE — 2500000005 HC RX 250 GENERAL PHARMACY W/O HCPCS: Performed by: STUDENT IN AN ORGANIZED HEALTH CARE EDUCATION/TRAINING PROGRAM

## 2025-01-14 PROCEDURE — A58561 PR HYSTEROSCOPY,RMV MYOMA: Performed by: ANESTHESIOLOGY

## 2025-01-14 PROCEDURE — 2720000007 HC OR 272 NO HCPCS: Performed by: STUDENT IN AN ORGANIZED HEALTH CARE EDUCATION/TRAINING PROGRAM

## 2025-01-14 PROCEDURE — 81025 URINE PREGNANCY TEST: CPT | Performed by: STUDENT IN AN ORGANIZED HEALTH CARE EDUCATION/TRAINING PROGRAM

## 2025-01-14 PROCEDURE — A58561 PR HYSTEROSCOPY,RMV MYOMA: Performed by: NURSE ANESTHETIST, CERTIFIED REGISTERED

## 2025-01-14 RX ORDER — FENTANYL CITRATE 50 UG/ML
25 INJECTION, SOLUTION INTRAMUSCULAR; INTRAVENOUS EVERY 5 MIN PRN
Status: DISCONTINUED | OUTPATIENT
Start: 2025-01-14 | End: 2025-01-14 | Stop reason: HOSPADM

## 2025-01-14 RX ORDER — LIDOCAINE HYDROCHLORIDE 10 MG/ML
0.1 INJECTION, SOLUTION EPIDURAL; INFILTRATION; INTRACAUDAL; PERINEURAL ONCE
Status: DISCONTINUED | OUTPATIENT
Start: 2025-01-14 | End: 2025-01-14 | Stop reason: HOSPADM

## 2025-01-14 RX ORDER — SODIUM CHLORIDE, SODIUM LACTATE, POTASSIUM CHLORIDE, CALCIUM CHLORIDE 600; 310; 30; 20 MG/100ML; MG/100ML; MG/100ML; MG/100ML
INJECTION, SOLUTION INTRAVENOUS CONTINUOUS PRN
Status: DISCONTINUED | OUTPATIENT
Start: 2025-01-14 | End: 2025-01-14

## 2025-01-14 RX ORDER — LIDOCAINE HYDROCHLORIDE 20 MG/ML
INJECTION, SOLUTION INFILTRATION; PERINEURAL AS NEEDED
Status: DISCONTINUED | OUTPATIENT
Start: 2025-01-14 | End: 2025-01-14

## 2025-01-14 RX ORDER — ACETAMINOPHEN 325 MG/1
650 TABLET ORAL EVERY 4 HOURS PRN
Status: DISCONTINUED | OUTPATIENT
Start: 2025-01-14 | End: 2025-01-14 | Stop reason: HOSPADM

## 2025-01-14 RX ORDER — ONDANSETRON HYDROCHLORIDE 2 MG/ML
INJECTION, SOLUTION INTRAVENOUS AS NEEDED
Status: DISCONTINUED | OUTPATIENT
Start: 2025-01-14 | End: 2025-01-14

## 2025-01-14 RX ORDER — ACETAMINOPHEN 325 MG/1
975 TABLET ORAL ONCE
Status: COMPLETED | OUTPATIENT
Start: 2025-01-14 | End: 2025-01-14

## 2025-01-14 RX ORDER — FENTANYL CITRATE 50 UG/ML
50 INJECTION, SOLUTION INTRAMUSCULAR; INTRAVENOUS EVERY 5 MIN PRN
Status: DISCONTINUED | OUTPATIENT
Start: 2025-01-14 | End: 2025-01-14 | Stop reason: HOSPADM

## 2025-01-14 RX ORDER — PROPOFOL 10 MG/ML
INJECTION, EMULSION INTRAVENOUS CONTINUOUS PRN
Status: DISCONTINUED | OUTPATIENT
Start: 2025-01-14 | End: 2025-01-14

## 2025-01-14 RX ORDER — SODIUM CHLORIDE, SODIUM LACTATE, POTASSIUM CHLORIDE, AND CALCIUM CHLORIDE .6; .31; .03; .02 G/100ML; G/100ML; G/100ML; G/100ML
IRRIGANT IRRIGATION AS NEEDED
Status: DISCONTINUED | OUTPATIENT
Start: 2025-01-14 | End: 2025-01-14 | Stop reason: HOSPADM

## 2025-01-14 RX ORDER — GABAPENTIN 300 MG/1
600 CAPSULE ORAL ONCE
Status: COMPLETED | OUTPATIENT
Start: 2025-01-14 | End: 2025-01-14

## 2025-01-14 RX ORDER — ONDANSETRON 4 MG/1
4 TABLET, FILM COATED ORAL EVERY 6 HOURS PRN
Qty: 9 TABLET | Refills: 0 | Status: SHIPPED | OUTPATIENT
Start: 2025-01-14 | End: 2025-01-21

## 2025-01-14 RX ORDER — ACETAMINOPHEN 325 MG/1
650 TABLET ORAL EVERY 6 HOURS PRN
Qty: 20 TABLET | Refills: 0 | Status: SHIPPED | OUTPATIENT
Start: 2025-01-14 | End: 2025-01-24

## 2025-01-14 RX ORDER — SODIUM CHLORIDE 0.9 G/100ML
INJECTION, SOLUTION IRRIGATION AS NEEDED
Status: DISCONTINUED | OUTPATIENT
Start: 2025-01-14 | End: 2025-01-14 | Stop reason: HOSPADM

## 2025-01-14 RX ORDER — LABETALOL HYDROCHLORIDE 5 MG/ML
5 INJECTION, SOLUTION INTRAVENOUS ONCE AS NEEDED
Status: DISCONTINUED | OUTPATIENT
Start: 2025-01-14 | End: 2025-01-14 | Stop reason: HOSPADM

## 2025-01-14 RX ORDER — ONDANSETRON HYDROCHLORIDE 2 MG/ML
4 INJECTION, SOLUTION INTRAVENOUS ONCE AS NEEDED
Status: DISCONTINUED | OUTPATIENT
Start: 2025-01-14 | End: 2025-01-14 | Stop reason: HOSPADM

## 2025-01-14 RX ORDER — FENTANYL CITRATE 50 UG/ML
INJECTION, SOLUTION INTRAMUSCULAR; INTRAVENOUS AS NEEDED
Status: DISCONTINUED | OUTPATIENT
Start: 2025-01-14 | End: 2025-01-14

## 2025-01-14 RX ORDER — OXYCODONE HYDROCHLORIDE 5 MG/1
5 TABLET ORAL EVERY 6 HOURS PRN
Status: DISCONTINUED | OUTPATIENT
Start: 2025-01-14 | End: 2025-01-14 | Stop reason: HOSPADM

## 2025-01-14 RX ORDER — DEXMEDETOMIDINE HYDROCHLORIDE 100 UG/ML
INJECTION, SOLUTION INTRAVENOUS AS NEEDED
Status: DISCONTINUED | OUTPATIENT
Start: 2025-01-14 | End: 2025-01-14

## 2025-01-14 RX ORDER — IBUPROFEN 600 MG/1
600 TABLET ORAL EVERY 6 HOURS PRN
Qty: 20 TABLET | Refills: 0 | Status: SHIPPED | OUTPATIENT
Start: 2025-01-14 | End: 2025-01-24

## 2025-01-14 RX ORDER — ALBUTEROL SULFATE 0.83 MG/ML
2.5 SOLUTION RESPIRATORY (INHALATION) ONCE AS NEEDED
Status: DISCONTINUED | OUTPATIENT
Start: 2025-01-14 | End: 2025-01-14 | Stop reason: HOSPADM

## 2025-01-14 RX ORDER — MIDAZOLAM HYDROCHLORIDE 1 MG/ML
INJECTION, SOLUTION INTRAMUSCULAR; INTRAVENOUS AS NEEDED
Status: DISCONTINUED | OUTPATIENT
Start: 2025-01-14 | End: 2025-01-14

## 2025-01-14 RX ORDER — KETOROLAC TROMETHAMINE 30 MG/ML
INJECTION, SOLUTION INTRAMUSCULAR; INTRAVENOUS AS NEEDED
Status: DISCONTINUED | OUTPATIENT
Start: 2025-01-14 | End: 2025-01-14

## 2025-01-14 RX ORDER — METOCLOPRAMIDE HYDROCHLORIDE 5 MG/ML
10 INJECTION INTRAMUSCULAR; INTRAVENOUS ONCE AS NEEDED
Status: DISCONTINUED | OUTPATIENT
Start: 2025-01-14 | End: 2025-01-14 | Stop reason: HOSPADM

## 2025-01-14 RX ADMIN — OXYCODONE HYDROCHLORIDE 5 MG: 5 TABLET ORAL at 12:48

## 2025-01-14 RX ADMIN — ACETAMINOPHEN 975 MG: 325 TABLET ORAL at 11:17

## 2025-01-14 RX ADMIN — GABAPENTIN 600 MG: 300 CAPSULE ORAL at 11:17

## 2025-01-14 RX ADMIN — DEXMEDETOMIDINE HYDROCHLORIDE 5 MCG: 100 INJECTION, SOLUTION INTRAVENOUS at 11:35

## 2025-01-14 RX ADMIN — FENTANYL CITRATE 50 MCG: 0.05 INJECTION, SOLUTION INTRAMUSCULAR; INTRAVENOUS at 11:25

## 2025-01-14 RX ADMIN — ONDANSETRON 4 MG: 2 INJECTION INTRAMUSCULAR; INTRAVENOUS at 12:01

## 2025-01-14 RX ADMIN — DEXAMETHASONE SODIUM PHOSPHATE 4 MG: 4 INJECTION INTRA-ARTICULAR; INTRALESIONAL; INTRAMUSCULAR; INTRAVENOUS; SOFT TISSUE at 11:28

## 2025-01-14 RX ADMIN — MIDAZOLAM 2 MG: 1 INJECTION INTRAMUSCULAR; INTRAVENOUS at 11:25

## 2025-01-14 RX ADMIN — FENTANYL CITRATE 25 MCG: 0.05 INJECTION, SOLUTION INTRAMUSCULAR; INTRAVENOUS at 12:17

## 2025-01-14 RX ADMIN — KETOROLAC TROMETHAMINE 30 MG: 30 INJECTION, SOLUTION INTRAMUSCULAR; INTRAVENOUS at 12:19

## 2025-01-14 RX ADMIN — DEXMEDETOMIDINE HYDROCHLORIDE 5 MCG: 100 INJECTION, SOLUTION INTRAVENOUS at 11:30

## 2025-01-14 RX ADMIN — PROPOFOL 140 MCG/KG/MIN: 10 INJECTION, EMULSION INTRAVENOUS at 11:25

## 2025-01-14 RX ADMIN — FENTANYL CITRATE 25 MCG: 0.05 INJECTION, SOLUTION INTRAMUSCULAR; INTRAVENOUS at 12:34

## 2025-01-14 RX ADMIN — SODIUM CHLORIDE, POTASSIUM CHLORIDE, SODIUM LACTATE AND CALCIUM CHLORIDE: 600; 310; 30; 20 INJECTION, SOLUTION INTRAVENOUS at 11:21

## 2025-01-14 RX ADMIN — LIDOCAINE HYDROCHLORIDE 40 MG: 20 INJECTION, SOLUTION INFILTRATION; PERINEURAL at 11:25

## 2025-01-14 SDOH — HEALTH STABILITY: MENTAL HEALTH: CURRENT SMOKER: 0

## 2025-01-14 ASSESSMENT — PAIN - FUNCTIONAL ASSESSMENT
PAIN_FUNCTIONAL_ASSESSMENT: 0-10

## 2025-01-14 ASSESSMENT — PAIN SCALES - GENERAL
PAIN_LEVEL: 0
PAINLEVEL_OUTOF10: 0 - NO PAIN
PAINLEVEL_OUTOF10: 2
PAINLEVEL_OUTOF10: 3

## 2025-01-14 NOTE — ANESTHESIA PREPROCEDURE EVALUATION
Patient: Iman Wooten    Procedure Information       Date/Time: 01/14/25 1145    Procedure: HYSTEROSCOPY (Uterus)    Location: [facility] [place] [facility] OR 05 / Virtual [facility] OR    Surgeons: Vika Cesar MD            Relevant Problems   Anesthesia (within normal limits)      Cardiac (within normal limits)      Pulmonary (within normal limits)      Neuro   (+) HERO (generalized anxiety disorder)   (+) Recurrent major depressive disorder, in full remission (CMS-HCC)      GI   (+) GERD (gastroesophageal reflux disease)      Endocrine   (+) Hypothyroidism      Musculoskeletal   (+) Chondromalacia of patella      GYN   (+) Abnormal uterine bleeding (AUB)   (+) Submucous leiomyoma of uterus       Clinical information reviewed:   Tobacco  Allergies  Meds  Problems  Med Hx  Surg Hx  OB Status    Fam Hx  Soc Hx        NPO Detail:  NPO/Void Status  Date of Last Liquid: 01/14/25  Time of Last Liquid: 0600  Date of Last Solid: 01/13/25  Time of Last Solid: 2130  Time of Last Void: 1047         Physical Exam    Airway  Mallampati: II  TM distance: >3 FB  Neck ROM: full     Cardiovascular - normal exam     Dental - normal exam     Pulmonary - normal exam     Abdominal - normal exam         Anesthesia Plan    History of general anesthesia?: yes  History of complications of general anesthesia?: no    ASA 2     MAC     The patient is not a current smoker.    intravenous induction   Postoperative administration of opioids is intended.  Anesthetic plan and risks discussed with patient.  Use of blood products discussed with patient who consented to blood products.    Plan discussed with CRNA and attending.

## 2025-01-14 NOTE — ANESTHESIA POSTPROCEDURE EVALUATION
Patient: Iman Wooten    Procedure Summary       Date: 01/14/25 Room / Location: [facility] [place] [facility] OR 05 / Virtual [facility]    Anesthesia Start: 1121 Anesthesia Stop: 1250    Procedure: HYSTEROSCOPY,D&C, MYECTOMY, IUD INSERTION (Uterus) Diagnosis:       Abnormal uterine bleeding (AUB)      Submucous leiomyoma of uterus      (Abnormal uterine bleeding (AUB) [N93.9])      (Submucous leiomyoma of uterus [D25.0])    Surgeons: Vika Cesar MD Responsible Provider: Alyssa Lemus MD    Anesthesia Type: MAC ASA Status: 2            Anesthesia Type: MAC    Vitals Value Taken Time   /61 01/14/25 1303   Temp 36.4 °C (97.5 °F) 01/14/25 1303   Pulse 61 01/14/25 1303   Resp 16 01/14/25 1303   SpO2 97 % 01/14/25 1303       Anesthesia Post Evaluation    Patient location during evaluation: PACU  Patient participation: complete - patient participated  Level of consciousness: awake  Pain score: 0  Pain management: adequate  Airway patency: patent  Cardiovascular status: acceptable  Respiratory status: acceptable  Hydration status: acceptable  Postoperative Nausea and Vomiting: none        There were no known notable events for this encounter.

## 2025-01-14 NOTE — OP NOTE
HYSTEROSCOPY,D&C, MYECTOMY, IUD INSERTION Operative Note     Date: 2025  [place] [facility] [place] [facility] [place]    Name: Iman Wooten, : 1980, Age: 44 y.o., MRN: [id number], Sex: female    Diagnosis  Pre-op Diagnosis      * Abnormal uterine bleeding (AUB) [N93.9]     * Submucous leiomyoma of uterus [D25.0] Post-op Diagnosis     * Abnormal uterine bleeding (AUB) [N93.9]     * Submucous leiomyoma of uterus [D25.0]     Procedures  HYSTEROSCOPY,D&C, MYECTOMY, IUD INSERTION  61346 - DE HYSTEROSCOPY DIAGNOSTIC SEPARATE PROCEDURE      Surgeons      * Vika Cesar - Primary    Resident/Fellow/Other Assistant:  Surgeons and Role:  * No surgeons found with a matching role *    Staff:   Shantellulator: Aziza  Circulator: Brigette Causey Person: Rossy Causey Person: Trudi    Anesthesia Staff: Anesthesiologist: Alyssa Lemus MD  CRNA: PREMA Cuello-CRNA    Procedure Summary  Anesthesia: Monitor Anesthesia Care  ASA: II  Estimated Blood Loss: 20 mL  Intra-op Medications:   Administrations occurring from 1145 to 1300 on 25:   Medication Name Total Dose   sodium chloride 0.9 % irrigation solution 4,000 mL   levonorgestrel (Mirena) 20 mcg/24hr IUD 1 each   lactated Ringer's irrigation solution 3,000 mL   fentaNYL PF 0.05 mg/mL 25 mcg   ketorolac (Toradol) 30 mg 30 mg   LR infusion Cannot be calculated   ondansetron (Zofran) 2 mg/mL injection 4 mg   propofol (Diprivan) injection 10 mg/mL 175.72 mg              Anesthesia Record               Intraprocedure I/O Totals          Intake    LR infusion 500.00 mL    Total Intake 500 mL          Specimen:   ID Type Source Tests Collected by Time   1 : ENDOMETRIUM CURRETTINGS Tissue ENDOMETRIUM CURETTINGS SURGICAL PATHOLOGY EXAM Vika Cesar MD 2025 1205   2 : FIBROID Tissue FIBROID MYOMECTOMY SURGICAL PATHOLOGY EXAM Vika Cesar MD 2025 1220           Drains and/or Catheters:   [REMOVED] Urethral Catheter (Removed)       Indications: Iman CLARK Sugar  is an 44 y.o. female who is having surgery for Abnormal uterine bleeding (AUB) [N93.9]  Submucous leiomyoma of uterus [D25.0].     The patient was seen in the preoperative area. The risks, benefits, complications, treatment options, non-operative alternatives, expected recovery and outcomes were discussed with the patient. The possibilities of reaction to medication, pulmonary aspiration, injury to surrounding structures, bleeding, recurrent infection, the need for additional procedures, failure to diagnose a condition, and creating a complication requiring transfusion or operation were discussed with the patient. The patient concurred with the proposed plan, giving informed consent.  The site of surgery was properly noted/marked if necessary per policy. The patient has been actively warmed in preoperative area. Preoperative antibiotics are not indicated. Venous thrombosis prophylaxis have been ordered including bilateral sequential compression devices    Procedure Details:     Indication: AUB    Findings: Large fundal submucosal fibroid on the left, small submucosal fibroid near R ostia, otherwise normal appearing endometrial cavity    Procedure: The patient was taken to the operating room where anesthesia was administered. She was then positioned in the dorsal lithotomy position and a bimanual exam was performed. She was prepped and draped in the normal sterile fashion. A metal speculum was placed in the vagina and the anterior lip of the cervix was grasped with a single tooth tenaculum. The cervix was dilated to 17 Serbian using Hurt dilators. A 5.7mm (Mini) Aveta hysteroscope was then inserted through the cervical canal and the interior of the uterus was examined. The fundus and R ostia were seen, left obstructed by large submucosal fibroid. The Wave resection device was inserted and the L fibroid was resected. The small submucosal R fibroid was also resected. Endometrial sampling was obtained from all walls of  the cavity. The hysteroscope was then removed.  Mirena IUD was placed at the fundus, and strings were trimmed. The single tooth tenaculum was removed and the cervix was found to be hemostatic. The speculum was removed.    Complications:  None; patient tolerated the procedure well.    Disposition: PACU - hemodynamically stable.  Condition: stable       Vika Cesar  Phone Number: [phone]

## 2025-01-14 NOTE — H&P
History Of Present Illness  Iman Wooten is a 44 y.o. female presenting for operative hysteroscopy, D+C, IUD placement in setting of AUB.    Pre op labs: Hgb 13.2     Past Medical History  Past Medical History:   Diagnosis Date    Acute candidiasis of vulva and vagina 2015    Yeast vaginitis    Encounter for supervision of other normal pregnancy, second trimester 2015    Prenatal care, subsequent pregnancy, second trimester    Long term (current) use of antibiotics 2014    Prophylactic antibiotic    Major depressive disorder, recurrent, mild (CMS-HCC) 2017    Depression, major, recurrent, mild    Maternal care for (suspected) hereditary disease in fetus, not applicable or unspecified ([facility]) 2014    Hereditary disease in family possibly affecting fetus, affecting management of mother, antepartum condition or complication    Other acute postprocedural pain 2014    Post-operative pain    Other conditions influencing health status 2015    History of pregnancy    Other specified anxiety disorders 2019    Other specified anxiety disorders    Overweight 2018    Overweight (BMI 25.0-29.9)    Pain in unspecified knee 2018    Joint pain, knee    Personal history of other mental and behavioral disorders     History of depression    Personal history of other mental and behavioral disorders 2018    History of anxiety    Personal history of other mental and behavioral disorders 2015    History of depression    Personal history of other mental and behavioral disorders 2019    History of anxiety disorder    Supervision of elderly multigravida, unspecified trimester ([facility]) 2015    AMA (advanced maternal age) multigravida 35+    Supervision of elderly primigravida, unspecified trimester ([facility]) 2014    Antepartum elder primigravida    Unspecified abnormal findings on  screening of mother 2014    Abnormal finding on   screen       Surgical History  Past Surgical History:   Procedure Laterality Date    DILATION AND CURETTAGE OF UTERUS  06/15/2015    Dilation And Curettage    MOUTH SURGERY  2015    Oral Surgery Tooth Extraction in childhood        Social History  She reports that she has never smoked. She has never used smokeless tobacco. She reports current alcohol use of about 2.0 standard drinks of alcohol per week. She reports that she does not use drugs.    Family History  Family History   Problem Relation Name Age of Onset    Anxiety disorder Mother      Depression Mother      Macular degeneration Mother      Ulcers Mother      Anxiety disorder Father      Coronary artery disease Father          in native artery    Depression Father      Hyperlipidemia Father      Hypertension Father      Autism Sister      Learning disabilities Sister      Autism Daughter      Anxiety disorder Mother's Sister      Depression Mother's Sister      Anxiety disorder Father's Sister      Depression Father's Sister      Diabetes type I Father's Brother      Breast cancer Maternal Grandmother      Anxiety disorder Maternal Grandmother      Depression Maternal Grandmother      Anxiety disorder Maternal Grandfather      Depression Maternal Grandfather      Anxiety disorder Paternal Grandmother      Depression Paternal Grandmother      Anxiety disorder Paternal Grandfather      Depression Paternal Grandfather      Other (acute gastritis) Other Unknown     Bone cancer Other Unknown     Colon cancer Other Unknown     Other (connective tissue defect) Other Unknown     Ovarian cancer Other Unknown     Other (thyroid) Other Unknown         Allergies  Patient has no known allergies.    Physical Exam   Gen: well appearing, no distress  HEENT: normocephalic   Pulm: normal work of breathing on room air  CV: warm, well perfused  GI: abdomen soft, non tender, non distended  : deferred to OR  MSK: normal range of motion  Ext: no LE edema    Last  "Recorded Vitals  Blood pressure 133/68, pulse 87, temperature 37 °C (98.6 °F), temperature source Temporal, resp. rate 16, height 1.727 m (5' 7.99\"), weight 95.2 kg (209 lb 14.1 oz), last menstrual period 12/29/2024, SpO2 98%.       Assessment/Plan     Iman Wooten is a 44 y.o. female presenting for operative hysteroscopy, D+C, IUD placement, in the setting of AUB,     Vika Cesar MD    "

## 2025-01-15 ASSESSMENT — PAIN SCALES - GENERAL: PAINLEVEL_OUTOF10: 2

## 2025-01-22 NOTE — PROGRESS NOTES
"  Patient ID: Iman Wooten is a 44 y.o. female who presents for No chief complaint on file..    Referring Provider: Francisco Javier Cope  Pt was referred for weight loss     Preferred Pharmacy:    [facility]/pharmacy #[id number] - [facility], [place] - [address]  [address]  [facility] [address]  Phone: [phone] Fax: [phone]    [facility] - [place], [place] - [address]  [address] [facility] [address]  Phone: [phone] Fax: [phone]      Subjective    Accidentally removed IUD a \"couple of months ago\" - started progesterone while out, getting a new one placed tomorrow.     While IUD was in, no bleeding.   When IUD came out, returned to having a cycle following 14 days of progesterone.    -Heavy with clots, cramps    Any plans for a pregnancy in the next year: No    Uterus: Yes    Movement:   Joint pain? No- has been on estradiol x ~1 year  Strength training? Jazzercise - weights component to each hour long class, 40% cardio and 20% free weights.   Recommended listening to podcast with Alina White  Recommend 2-3x/week for 20 minutes  Osteopenia or osteoporosis: No    Stress Level (rate 1-10): 7-8 out of 10   9 yo daughter with Autism   Exercise is a stress reliever  Started with [facility] [employer] therapist - daughter + weight & body image  Recommend daily 2-5 minutes of mindfulness, meditation, journaling, etc to help reduce cortisol levels.     Depression? Yes, life long on sertraline    Sleep:   Quantity/Quality/Regularity? \"Not great\" - 5-6 hours nightly, wears fit bit sleep quality is \"fair\".   Daytime brain fog/memory troubles? Yes- estradiol helped, but still noticeable.   Tobacco? No  Alcohol? Yes. 1 glass of wine every couple day  Recommended reducing alcohol consumption  THC? No    Patient identified goals:       Onset:   3-4 years ago (COVID work from home)  Nothing else had changed, eating/moving the same, exercising more.   Overall weight gain about 50 " "lbs  Noticing ankles hurting more during jazzercise. 3-4 days/week in studio and 1-2 mornings at home.     Current challenges:   Mental - has been on and off diets entire life  Most consistent weight was 180  Adverse to tracking  Time  Feeling short on space for meal prep/planning    How long implementing diet/lifestyle change for weight loss:   Lifelong    Disordered eating patterns:   None    Weight Loss Drug Therapy Options Screening:     GLP-1 and Metformin:     Pre-Diabetes/Diabetes? No  Lab Results   Component Value Date    INSULFAST 15 08/06/2024    HGBA1C 5.2 08/06/2024       Pertinent PMH Review:   PMH of Pancreatitis: No  PMH of Gallbladder disease: No  PMH of Delayed Gastric Emptying: No  GI issues? GERD  Frequency of BM? Daily  PMH of MTC: No  PMH of Retinopathy: No  PMH of Suicidal Ideation: No    Topiramate:   Migraines? No    Adipex:   Anxiety? Yes    Thyroid health:   Lab Results   Component Value Date    TSH 1.24 12/20/2024        HRT  -Estradiol 1mg oral and IUD    Medications potentially contributing to weight gain:   None    Medications tried/stopped for weight management:    None     Concurrent Chronic Medical Conditions:   Cardiovascular Health  The 10-year ASCVD risk score (David PAYAN, et al., 2019) is: 0.3%    Values used to calculate the score:      Age: 44 years      Sex: Female      Is Non- : No      Diabetic: No      Tobacco smoker: No      Systolic Blood Pressure: 106 mmHg      Is BP treated: No      HDL Cholesterol: 67.8 mg/dL      Total Cholesterol: 174 mg/dL    Lab Results   Component Value Date    CHOL 174 08/06/2024     Lab Results   Component Value Date    HDL 67.8 08/06/2024     Lab Results   Component Value Date    LDLCALC 74 08/06/2024     Lab Results   Component Value Date    TRIG 163 (H) 08/06/2024     No components found for: \"CHOLHDL\"   BP Readings from Last 3 Encounters:   01/14/25 106/61   01/02/25 118/68   12/18/24 130/74      Blood Sugar " "Balance  Lab Results   Component Value Date    GLUCOSE 89 2024    HGBA1C 5.2 2024     Lab Results   Component Value Date    INSULFAST 15 2024         Thyroid  Lab Results   Component Value Date    TSH 1.24 2024     Iron Status  No results found for: \"IRON\", \"TIBC\", \"FERRITIN\"     Kidney Function  Lab Results   Component Value Date    GFRF >90 2023    CREATININE 0.92 2024       Potassium  Lab Results   Component Value Date    K 3.9 2024        Vitamin D3  Lab Results   Component Value Date    VITD25 39 2024   Recommended 2000 international units  daily, with a fat containing meal    Current Outpatient Medications on File Prior to Visit   Medication Sig Dispense Refill    acetaminophen (Tylenol) 325 mg tablet Take 2 tablets (650 mg) by mouth every 6 hours if needed for mild pain (1 - 3) for up to 20 doses. 20 tablet 0    cholecalciferol (Vitamin D-3) 25 MCG (1000 UT) capsule Take by mouth.      ibuprofen 600 mg tablet Take 1 tablet (600 mg) by mouth every 6 hours if needed for moderate pain (4 - 6) for up to 20 doses. 20 tablet 0    levothyroxine (Synthroid, Levoxyl) 50 mcg tablet Take 1 tablet (50 mcg) by mouth once daily in the morning. Take on empty stomach 30  to 60 minutes before first meal of the day 90 tablet 3    [] ondansetron (Zofran) 4 mg tablet Take 1 tablet (4 mg) by mouth every 6 hours if needed for nausea for up to 20 doses. 9 tablet 0    polyethylene glycol (GoLYTELY) 236-22.74-6.74 -5.86 gram solution Drink 1/2 starting at 6 pm the night before your procedure then drink the 2nd 1/2 5 hours before procedure arrival time 4000 mL 0    sertraline (Zoloft) 100 mg tablet Take 1.5 tablets (150 mg) by mouth once daily. 135 tablet 3    tirzepatide, weight loss, (Zepbound) 5 mg/0.5 mL solution Inject 5 mg under the skin every 7 days. ICD-10 code: z68.35 2 mL 11     No current facility-administered medications on file prior to visit.        Lifestyle and " "Nourishment Recommendations  Currently 3 meals daily with a mid morning and mid afternoon snack, sometimes after dinner snack.   Dinner at 8pm on gym days  Not gym days: 6:30pm  Bedtime: 9:30-10:30pm  Ideally last food 3 hours prior to bedtime.   Encouraged 1/2 of plate colorful vegetables at each meal  Focus on whole foods as close to nature as possible (less than 5 ingredients on the label)  Increase high quality protein to 25-30 grams per meal along with 2-3 x/week resistance training  Discussed [employer] of [employer] Meats  Patient had questions regarding protein shakes, currently drinking 1-800-DENTIST protein shakes, recommended discontinuation. Will send recommendation for Whey Protein powder.   Include daily walking (150 min/week) and mind/body activities (meditation, mindfulness, journaling, etc.) for a minimum of 2-5 minutes daily to reduce cortisol levels.   Increase fiber 25-30 grams daily (work up slowly to avoid GI distress)  Goal: Daily bowel movement  Beverages: Focus on water, organic tea (loose leave or in paper, avoid plastic sachets), or sparkling/mineral water (ex. Spindrift, Emir)   Currently drinking 2-3 8pz cups daily. Last 3-4pm.   No more than 1 cup (8oz) of organic coffee daily prior to noon. May consider organic green tea.   Avoid alcohol   Avoid ALL artificial sweeteners   Prioritize 7-8 hours of restful sleep nightly.   Turn off electronics 1 hour prior to bedtime, no electronics in the bedroom.  Establish a regular sleep/wake time (+/- 30 minutes)     Medication and allergy reconciliation completed     Drug Interactions   No significant drug interactions identified    Assessment/Plan   Patients goals:   \"To be less than 200 lbs\"   Discussed patients history, current medical conditions, medications, as well as current diet and lifestyle in depth.   Patient has been making diet/lifestyle changes for: >10 years  BMI: 35  MARLENI IR: 3.4 (>2 indicates potential insulin resistance  Concurrent medical " conditions: hypothyroidism.   Levothyroxine was increased to 50 mcg, tolerating well.   Discussed elevated MARLENI-IR  Iman took metformin x 30 days at 500mg XL and x 30 days at 1000mg XL and has not noticed much benefit, discontinued and switched to zepbound vials.   Zepbound 5mg  Patient recently had IUD placed while doing procedure for removal of fibroids.   Tolerating well, no side effects  Started Benefiber to prevent constipation  -20 lbs since starting   Jazzercize 3 days/week  Recommend an additional 20 minutes 2x/week of strength  Would like to stay at current dose of 5mg weekly.       CONTINUE  Zepbound 5mg once weekly (Saturday)    Follow-up: 3/11/25 4pm      Time spent with pt: Total length of time 20 (minutes) of the encounter and more than 50% was spent counseling the patient.    Carmina Elizalde, Pharm.D, FAM, [facility]  Clinical Pharmacist  Pharmacy Services  [phone]    Continue all meds under the continuation of care with the referring provider and clinical pharmacy team.    Verbal consent to manage patient's drug therapy was obtained from the patient and/or an individual authorized to act on behalf of a patient. They were informed they may decline to participate or withdraw from participation in pharmacy services at any time.

## 2025-01-23 ENCOUNTER — APPOINTMENT (OUTPATIENT)
Dept: PHARMACY | Facility: HOSPITAL | Age: 45
End: 2025-01-23
Payer: COMMERCIAL

## 2025-01-23 DIAGNOSIS — R63.4 WEIGHT LOSS: ICD-10-CM

## 2025-01-23 DIAGNOSIS — K21.00 GASTROESOPHAGEAL REFLUX DISEASE WITH ESOPHAGITIS, UNSPECIFIED WHETHER HEMORRHAGE: ICD-10-CM

## 2025-01-23 DIAGNOSIS — E03.9 HYPOTHYROIDISM, UNSPECIFIED TYPE: ICD-10-CM

## 2025-01-24 LAB
LABORATORY COMMENT REPORT: NORMAL
LABORATORY COMMENT REPORT: NORMAL
PATH REPORT.FINAL DX SPEC: NORMAL
PATH REPORT.FINAL DX SPEC: NORMAL
PATH REPORT.GROSS SPEC: NORMAL
PATH REPORT.GROSS SPEC: NORMAL
PATH REPORT.RELEVANT HX SPEC: NORMAL
PATH REPORT.RELEVANT HX SPEC: NORMAL
PATH REPORT.TOTAL CANCER: NORMAL
PATH REPORT.TOTAL CANCER: NORMAL

## 2025-01-30 ENCOUNTER — APPOINTMENT (OUTPATIENT)
Dept: OBSTETRICS AND GYNECOLOGY | Facility: CLINIC | Age: 45
End: 2025-01-30
Payer: COMMERCIAL

## 2025-01-30 VITALS
DIASTOLIC BLOOD PRESSURE: 72 MMHG | HEIGHT: 68 IN | SYSTOLIC BLOOD PRESSURE: 118 MMHG | WEIGHT: 211 LBS | BODY MASS INDEX: 31.98 KG/M2

## 2025-01-30 DIAGNOSIS — Z09 POSTOPERATIVE EXAMINATION: ICD-10-CM

## 2025-01-30 PROCEDURE — 99024 POSTOP FOLLOW-UP VISIT: CPT | Performed by: STUDENT IN AN ORGANIZED HEALTH CARE EDUCATION/TRAINING PROGRAM

## 2025-01-30 PROCEDURE — 3008F BODY MASS INDEX DOCD: CPT | Performed by: STUDENT IN AN ORGANIZED HEALTH CARE EDUCATION/TRAINING PROGRAM

## 2025-01-30 ASSESSMENT — ENCOUNTER SYMPTOMS
ALLERGIC/IMMUNOLOGIC NEGATIVE: 0
GASTROINTESTINAL NEGATIVE: 0
HEMATOLOGIC/LYMPHATIC NEGATIVE: 0
NEUROLOGICAL NEGATIVE: 0
CARDIOVASCULAR NEGATIVE: 0
MUSCULOSKELETAL NEGATIVE: 0
ENDOCRINE NEGATIVE: 0
RESPIRATORY NEGATIVE: 0
EYES NEGATIVE: 0
PSYCHIATRIC NEGATIVE: 0
CONSTITUTIONAL NEGATIVE: 0

## 2025-01-30 ASSESSMENT — PAIN SCALES - GENERAL: PAINLEVEL_OUTOF10: 0-NO PAIN

## 2025-01-30 NOTE — PROGRESS NOTES
"Subjective   Iman Wooten is a 44 y.o.  who presents today for post op follow up. Had hysteroscopy, myomectomy, IUD placement on . Uncomplicated procedure.     Pathology:   A. ENDOMETRIUM, CURETTAGE:  -- WEAKLY PROLIFERATIVE ENDOMETRIUM WITH STROMAL CHANGES SUGGESTIVE OF HORMONE EFFECT.     B. \"FIBROID\", MYOMECTOMY:  -- FRAGMENTS OF BENIGN SMOOTH MUSCLE WITH HYALINIZATION, CONSISTENT WITH LEIOMYOMA.  -- FRAGMENTS OF WEAKLY PROLIFERATIVE ENDOMETRIUM.    Recovering well. Spotting and cramping for a few days post procedure, now bleeding has stopped. Has not resumed menses. Otherwise meeting all post op milestones and pain well controlled.     Objective   Physical Exam  Vitals:    25 1010   BP: 118/72      Gen: awake, alert  Head: NCAT  HEENT: moist mucus membranes  Pulm: breathing comfortably on room air  CV: warm and well-perfused  Abd: non distended  : speculum exam performed, normal vaginal mucosa, IUD strings visualized  Neuro: alert and oriented  Psych: appropriate affect     Assessment/Plan     Iman Wooten is a 44 y.o.  who presents today for post op check    - Recovering appropriately, meeting post op milestones  - Pathology benign  - IUD strings confirmed  - Discussed Mirena approved for 8 years and implications for detecting menopause    Follow up in 1 year for annual exam     Vika Cesar MD     "

## 2025-02-03 ENCOUNTER — APPOINTMENT (OUTPATIENT)
Dept: GASTROENTEROLOGY | Facility: EXTERNAL LOCATION | Age: 45
End: 2025-02-03
Payer: COMMERCIAL

## 2025-02-03 DIAGNOSIS — Z12.11 ENCOUNTER FOR SCREENING FOR MALIGNANT NEOPLASM OF COLON: Primary | ICD-10-CM

## 2025-02-03 PROCEDURE — G0121 COLON CA SCRN NOT HI RSK IND: HCPCS | Performed by: INTERNAL MEDICINE

## 2025-02-03 NOTE — PROGRESS NOTES
Colonoscopy performed today 2/3/2025 at the [facility] ([facility]  See procedure report(s) under Media tab.

## 2025-03-06 NOTE — PROGRESS NOTES
"  Patient ID: Iman Wooten is a 44 y.o. female who presents for No chief complaint on file..    Referring Provider: Francisco Javier Cope  Pt was referred for weight loss     Preferred Pharmacy:    [facility]/pharmacy #[id number] - [facility], [place] - [address]  [address]  [facility] [address]  Phone: [phone] Fax: [phone]    [facility] - [place], [place] - [address]  [address] [address]  Phone: [phone] Fax: [phone]      Subjective    Accidentally removed IUD a \"couple of months ago\" - started progesterone while out, getting a new one placed tomorrow.     While IUD was in, no bleeding.   When IUD came out, returned to having a cycle following 14 days of progesterone.    -Heavy with clots, cramps    Any plans for a pregnancy in the next year: No    Uterus: Yes    Movement:   Joint pain? No- has been on estradiol x ~1 year  Strength training? Jazzercise - weights component to each hour long class, 40% cardio and 20% free weights.   Recommended listening to podcast with Alina White  Recommend 2-3x/week for 20 minutes  Osteopenia or osteoporosis: No    Stress Level (rate 1-10): 7-8 out of 10   9 yo daughter with Autism   Exercise is a stress reliever  Started with [facility] [employer] therapist - daughter + weight & body image  Recommend daily 2-5 minutes of mindfulness, meditation, journaling, etc to help reduce cortisol levels.     Depression? Yes, life long on sertraline    Sleep:   Quantity/Quality/Regularity? \"Not great\" - 5-6 hours nightly, wears fit bit sleep quality is \"fair\".   Daytime brain fog/memory troubles? Yes- estradiol helped, but still noticeable.   Tobacco? No  Alcohol? Yes. 1 glass of wine every couple day  Recommended reducing alcohol consumption  THC? No    Patient identified goals:       Onset:   3-4 years ago (COVID work from home)  Nothing else had changed, eating/moving the same, exercising more.   Overall weight gain about 50 " "lbs  Noticing ankles hurting more during jazzercise. 3-4 days/week in studio and 1-2 mornings at home.     Current challenges:   Mental - has been on and off diets entire life  Most consistent weight was 180  Adverse to tracking  Time  Feeling short on space for meal prep/planning    How long implementing diet/lifestyle change for weight loss:   Lifelong    Disordered eating patterns:   None    Weight Loss Drug Therapy Options Screening:     GLP-1 and Metformin:     Pre-Diabetes/Diabetes? No  Lab Results   Component Value Date    INSULFAST 15 08/06/2024    HGBA1C 5.2 08/06/2024       Pertinent PMH Review:   PMH of Pancreatitis: No  PMH of Gallbladder disease: No  PMH of Delayed Gastric Emptying: No  GI issues? GERD  Frequency of BM? Daily  PMH of MTC: No  PMH of Retinopathy: No  PMH of Suicidal Ideation: No    Topiramate:   Migraines? No    Adipex:   Anxiety? Yes    Thyroid health:   Lab Results   Component Value Date    TSH 1.24 12/20/2024        HRT  -Estradiol 1mg oral and IUD    Medications potentially contributing to weight gain:   None    Medications tried/stopped for weight management:    None     Concurrent Chronic Medical Conditions:   Cardiovascular Health  The 10-year ASCVD risk score (David PAYAN, et al., 2019) is: 0.4%    Values used to calculate the score:      Age: 44 years      Sex: Female      Is Non- : No      Diabetic: No      Tobacco smoker: No      Systolic Blood Pressure: 118 mmHg      Is BP treated: No      HDL Cholesterol: 67.8 mg/dL      Total Cholesterol: 174 mg/dL    Lab Results   Component Value Date    CHOL 174 08/06/2024     Lab Results   Component Value Date    HDL 67.8 08/06/2024     Lab Results   Component Value Date    LDLCALC 74 08/06/2024     Lab Results   Component Value Date    TRIG 163 (H) 08/06/2024     No components found for: \"CHOLHDL\"   BP Readings from Last 3 Encounters:   01/30/25 118/72   01/14/25 106/61   01/02/25 118/68      Blood Sugar " "Balance  Lab Results   Component Value Date    GLUCOSE 89 11/28/2024    HGBA1C 5.2 08/06/2024     Lab Results   Component Value Date    INSULFAST 15 08/06/2024         Thyroid  Lab Results   Component Value Date    TSH 1.24 12/20/2024     Iron Status  No results found for: \"IRON\", \"TIBC\", \"FERRITIN\"     Kidney Function  Lab Results   Component Value Date    GFRF >90 05/27/2023    CREATININE 0.92 11/28/2024       Potassium  Lab Results   Component Value Date    K 3.9 11/28/2024        Vitamin D3  Lab Results   Component Value Date    VITD25 39 08/06/2024   Recommended 2000 international units  daily, with a fat containing meal    Current Outpatient Medications on File Prior to Visit   Medication Sig Dispense Refill    cholecalciferol (Vitamin D-3) 25 MCG (1000 UT) capsule Take by mouth.      levothyroxine (Synthroid, Levoxyl) 50 mcg tablet Take 1 tablet (50 mcg) by mouth once daily in the morning. Take on empty stomach 30  to 60 minutes before first meal of the day 90 tablet 3    sertraline (Zoloft) 100 mg tablet Take 1.5 tablets (150 mg) by mouth once daily. 135 tablet 3    tirzepatide, weight loss, (Zepbound) 5 mg/0.5 mL solution Inject 5 mg under the skin every 7 days. ICD-10 code: z68.35 2 mL 11     No current facility-administered medications on file prior to visit.        Lifestyle and Nourishment Recommendations  Currently 3 meals daily with a mid morning and mid afternoon snack, sometimes after dinner snack.   Dinner at 8pm on gym days  Not gym days: 6:30pm  Bedtime: 9:30-10:30pm  Ideally last food 3 hours prior to bedtime.   Encouraged 1/2 of plate colorful vegetables at each meal  Focus on whole foods as close to nature as possible (less than 5 ingredients on the label)  Increase high quality protein to 25-30 grams per meal along with 2-3 x/week resistance training  Discussed Force of Nature Meats  Patient had questions regarding protein shakes, currently drinking Fairlife protein shakes, recommended " "discontinuation. Will send recommendation for Whey Protein powder.   Include daily walking (150 min/week) and mind/body activities (meditation, mindfulness, journaling, etc.) for a minimum of 2-5 minutes daily to reduce cortisol levels.   Increase fiber 25-30 grams daily (work up slowly to avoid GI distress)  Goal: Daily bowel movement  Beverages: Focus on water, organic tea (loose leave or in paper, avoid plastic sachets), or sparkling/mineral water (ex. Spindrift, Emir)   Currently drinking 2-3 8pz cups daily. Last 3-4pm.   No more than 1 cup (8oz) of organic coffee daily prior to noon. May consider organic green tea.   Avoid alcohol   Avoid ALL artificial sweeteners   Prioritize 7-8 hours of restful sleep nightly.   Turn off electronics 1 hour prior to bedtime, no electronics in the bedroom.  Establish a regular sleep/wake time (+/- 30 minutes)     Medication and allergy reconciliation completed     Drug Interactions   No significant drug interactions identified    Assessment/Plan   Patients goals:   \"To be less than 200 lbs\"   Discussed patients history, current medical conditions, medications, as well as current diet and lifestyle in depth.   Patient has been making diet/lifestyle changes for: >10 years  BMI: 35  MARLENI IR: 3.4 (>2 indicates potential insulin resistance  Concurrent medical conditions: hypothyroidism.   Levothyroxine was increased to 50 mcg, tolerating well.   Discussed elevated MARLENI-IR  Iman took metformin x 30 days at 500mg XL and x 30 days at 1000mg XL and has not noticed much benefit, discontinued and switched to zepbound vials.   Zepbound 5mg  Going well, feeling good, making a big difference in workouts and day to day  Feeling she has plateaued  -25 lbs since starting  Jazzercize 3 days/week  Recommend an additional 20 minutes 2x/week of strength  Would like to increase to 7.5mg   Has 4 doses of 5mg on hand, will hold on to for when ready to reduce dose     INCREASE  Zepbound 7.5mg once " weekly (Saturday)    Follow-up: 4/15/25 2pm      Time spent with pt: Total length of time 15 (minutes) of the encounter and more than 50% was spent counseling the patient.    Carmina Elizalde, Pharm.D, [facility], [facility]  Clinical Pharmacist  Pharmacy Services  [phone]    Continue all meds under the continuation of care with the referring provider and clinical pharmacy team.    Verbal consent to manage patient's drug therapy was obtained from the patient and/or an individual authorized to act on behalf of a patient. They were informed they may decline to participate or withdraw from participation in pharmacy services at any time.

## 2025-03-07 ENCOUNTER — TELEMEDICINE (OUTPATIENT)
Dept: PHARMACY | Facility: HOSPITAL | Age: 45
End: 2025-03-07
Payer: COMMERCIAL

## 2025-03-07 ENCOUNTER — APPOINTMENT (OUTPATIENT)
Dept: PHARMACY | Facility: HOSPITAL | Age: 45
End: 2025-03-07
Payer: COMMERCIAL

## 2025-03-07 DIAGNOSIS — E03.9 HYPOTHYROIDISM, UNSPECIFIED TYPE: ICD-10-CM

## 2025-03-07 DIAGNOSIS — K21.00 GASTROESOPHAGEAL REFLUX DISEASE WITH ESOPHAGITIS, UNSPECIFIED WHETHER HEMORRHAGE: ICD-10-CM

## 2025-03-07 DIAGNOSIS — R63.4 WEIGHT LOSS: ICD-10-CM

## 2025-03-11 ENCOUNTER — APPOINTMENT (OUTPATIENT)
Dept: PHARMACY | Facility: HOSPITAL | Age: 45
End: 2025-03-11
Payer: COMMERCIAL

## 2025-04-15 ENCOUNTER — APPOINTMENT (OUTPATIENT)
Dept: PHARMACY | Facility: HOSPITAL | Age: 45
End: 2025-04-15
Payer: COMMERCIAL

## 2025-04-15 DIAGNOSIS — K21.00 GASTROESOPHAGEAL REFLUX DISEASE WITH ESOPHAGITIS, UNSPECIFIED WHETHER HEMORRHAGE: ICD-10-CM

## 2025-04-15 DIAGNOSIS — E03.9 HYPOTHYROIDISM, UNSPECIFIED TYPE: ICD-10-CM

## 2025-04-15 DIAGNOSIS — R63.4 WEIGHT LOSS: ICD-10-CM

## 2025-04-15 RX ORDER — BISMUTH SUBSALICYLATE 262 MG
1 TABLET,CHEWABLE ORAL DAILY
COMMUNITY

## 2025-04-15 NOTE — PROGRESS NOTES
"  Patient ID: Iman Wooten is a 44 y.o. female who presents for No chief complaint on file..    Referring Provider: Francisco Javier Cope  Pt was referred for weight loss     Preferred Pharmacy:    [facility]/pharmacy #[id number] - [facility], [place] - [address]  [address]  [facility] [address]  Phone: [phone] Fax: [phone]    [facility] - [place], [place] - [address] [facility]  [address] [address]  Phone: [phone] Fax: [phone]      Subjective    Accidentally removed IUD a \"couple of months ago\" - started progesterone while out, getting a new one placed tomorrow.     While IUD was in, no bleeding.   When IUD came out, returned to having a cycle following 14 days of progesterone.    -Heavy with clots, cramps    Any plans for a pregnancy in the next year: No    Uterus: Yes    Movement:   Joint pain? No- has been on estradiol x ~1 year  Strength training? Jazzercise - weights component to each hour long class, 40% cardio and 20% free weights.   Recommended listening to podcast with Alina White  Recommend 2-3x/week for 20 minutes  Osteopenia or osteoporosis: No    Stress Level (rate 1-10): 7-8 out of 10   9 yo daughter with Autism   Exercise is a stress reliever  Started with [facility] - daughter + weight & body image  Recommend daily 2-5 minutes of mindfulness, meditation, journaling, etc to help reduce cortisol levels.     Depression? Yes, life long on sertraline    Sleep:   Quantity/Quality/Regularity? \"Not great\" - 5-6 hours nightly, wears fit bit sleep quality is \"fair\".   Daytime brain fog/memory troubles? Yes- estradiol helped, but still noticeable.   Tobacco? No  Alcohol? Yes. 1 glass of wine every couple day  Recommended reducing alcohol consumption  THC? No    Patient identified goals:       Onset:   3-4 years ago (COVID work from home)  Nothing else had changed, eating/moving the same, exercising more.   Overall weight gain about 50 " "lbs  Noticing ankles hurting more during jazzercise. 3-4 days/week in studio and 1-2 mornings at home.     Current challenges:   Mental - has been on and off diets entire life  Most consistent weight was 180  Adverse to tracking  Time  Feeling short on space for meal prep/planning    How long implementing diet/lifestyle change for weight loss:   Lifelong    Disordered eating patterns:   None    Weight Loss Drug Therapy Options Screening:     GLP-1 and Metformin:     Pre-Diabetes/Diabetes? No  Lab Results   Component Value Date    INSULFAST 15 08/06/2024    HGBA1C 5.2 08/06/2024       Pertinent PMH Review:   PMH of Pancreatitis: No  PMH of Gallbladder disease: No  PMH of Delayed Gastric Emptying: No  GI issues? GERD  Frequency of BM? Daily  PMH of MTC: No  PMH of Retinopathy: No  PMH of Suicidal Ideation: No    Topiramate:   Migraines? No    Adipex:   Anxiety? Yes    Thyroid health:   Lab Results   Component Value Date    TSH 1.24 12/20/2024        HRT  -Estradiol 1mg oral and IUD    Medications potentially contributing to weight gain:   None    Medications tried/stopped for weight management:    None     Concurrent Chronic Medical Conditions:   Cardiovascular Health  The 10-year ASCVD risk score (David PAYAN, et al., 2019) is: 0.4%    Values used to calculate the score:      Age: 44 years      Sex: Female      Is Non- : No      Diabetic: No      Tobacco smoker: No      Systolic Blood Pressure: 118 mmHg      Is BP treated: No      HDL Cholesterol: 67.8 mg/dL      Total Cholesterol: 174 mg/dL    Lab Results   Component Value Date    CHOL 174 08/06/2024     Lab Results   Component Value Date    HDL 67.8 08/06/2024     Lab Results   Component Value Date    LDLCALC 74 08/06/2024     Lab Results   Component Value Date    TRIG 163 (H) 08/06/2024     No components found for: \"CHOLHDL\"   BP Readings from Last 3 Encounters:   01/30/25 118/72   01/14/25 106/61   01/02/25 118/68      Blood Sugar " "Balance  Lab Results   Component Value Date    GLUCOSE 89 11/28/2024    HGBA1C 5.2 08/06/2024     Lab Results   Component Value Date    INSULFAST 15 08/06/2024         Thyroid  Lab Results   Component Value Date    TSH 1.24 12/20/2024     Iron Status  No results found for: \"IRON\", \"TIBC\", \"FERRITIN\"     Kidney Function  Lab Results   Component Value Date    GFRF >90 05/27/2023    CREATININE 0.92 11/28/2024       Potassium  Lab Results   Component Value Date    K 3.9 11/28/2024        Vitamin D3  Lab Results   Component Value Date    VITD25 39 08/06/2024   Recommended 2000 international units  daily, with a fat containing meal    Current Outpatient Medications on File Prior to Visit   Medication Sig Dispense Refill    cholecalciferol (Vitamin D-3) 25 MCG (1000 UT) capsule Take by mouth.      levothyroxine (Synthroid, Levoxyl) 50 mcg tablet Take 1 tablet (50 mcg) by mouth once daily in the morning. Take on empty stomach 30  to 60 minutes before first meal of the day 90 tablet 3    sertraline (Zoloft) 100 mg tablet Take 1.5 tablets (150 mg) by mouth once daily. 135 tablet 3    tirzepatide, weight loss, (Zepbound) 7.5 mg/0.5 mL injection Inject 7.5 mg under the skin every 7 days. ICD-10 code: z68.35 4 each 11     No current facility-administered medications on file prior to visit.        Lifestyle and Nourishment Recommendations  Currently 3 meals daily with a mid morning and mid afternoon snack, sometimes after dinner snack.   Dinner at 8pm on gym days  Not gym days: 6:30pm  Bedtime: 9:30-10:30pm  Ideally last food 3 hours prior to bedtime.   Encouraged 1/2 of plate colorful vegetables at each meal  Focus on whole foods as close to nature as possible (less than 5 ingredients on the label)  Increase high quality protein to 25-30 grams per meal along with 2-3 x/week resistance training  Discussed [employer] Meats  Patient had questions regarding protein shakes, currently drinking [employer] protein shakes, recommended " "discontinuation. Will send recommendation for Whey Protein powder.   Include daily walking (150 min/week) and mind/body activities (meditation, mindfulness, journaling, etc.) for a minimum of 2-5 minutes daily to reduce cortisol levels.   Increase fiber 25-30 grams daily (work up slowly to avoid GI distress)  Goal: Daily bowel movement  Beverages: Focus on water, organic tea (loose leave or in paper, avoid plastic sachets), or sparkling/mineral water (ex. Spindrift, Emir)   Currently drinking 2-3 8pz cups daily. Last 3-4pm.   No more than 1 cup (8oz) of organic coffee daily prior to noon. May consider organic green tea.   Avoid alcohol   Avoid ALL artificial sweeteners   Prioritize 7-8 hours of restful sleep nightly.   Turn off electronics 1 hour prior to bedtime, no electronics in the bedroom.  Establish a regular sleep/wake time (+/- 30 minutes)     Medication and allergy reconciliation completed     Drug Interactions   No significant drug interactions identified    Assessment/Plan   Patients goals:   \"To be less than 200 lbs\" - updated goal to 185 lbs.   Discussed patients history, current medical conditions, medications, as well as current diet and lifestyle in depth.   Patient has been making diet/lifestyle changes for: >10 years  BMI: 35  MARLENI IR: 3.4 (>2 indicates potential insulin resistance  Concurrent medical conditions: hypothyroidism.   Levothyroxine was increased to 50 mcg, tolerating well.   Discussed elevated MARLENI-IR  Iman took metformin x 30 days at 500mg XL and x 30 days at 1000mg XL and has not noticed much benefit, discontinued and switched to zepbound vials.   Zepbound 7.5mg  Doing very well overall, dose increase helped lessen hunger  Noticing more heartburn (after acidic foods, wine, and sometimes without identifiable cause) since dose increase, has been taking zantac prn which has been helpful  Increased bruising at the injection site over the past 2 weeks  Recommend switching the site to " the abdomen  She is eating greens and taking a MVI, adequate vitamin K  Reached initial goal - just under 200 lbs   Would like to stay at current dose, would like to loose 10-15 more pounds (185 lbs).   She has been noticing she has been craving more fruit and carbohydrates   Had been focusing on protein, encouraged listening to cravings and incorporating complex carbs as final part of meals (complex carbohydrates help support progesterone production).   Jazzercise using weights, recently increased weight that she's using (12 lbs) 3 days/week      CONTINUE  Zepbound 7.5mg once weekly (Saturday)    Follow-up: 5/13/25 2:40pm      Time spent with pt: Total length of time 20 (minutes) of the encounter and more than 50% was spent counseling the patient.    Carmina Elizalde, Pharm.D, F[facility], [facility]  Clinical Pharmacist  Pharmacy Services  [phone]    Continue all meds under the continuation of care with the referring provider and clinical pharmacy team.    Verbal consent to manage patient's drug therapy was obtained from the patient and/or an individual authorized to act on behalf of a patient. They were informed they may decline to participate or withdraw from participation in pharmacy services at any time.

## 2025-05-12 NOTE — PROGRESS NOTES
"  Patient ID: Iman Wooten is a 44 y.o. female who presents for No chief complaint on file..    Referring Provider: Francisco Javier Cope  Pt was referred for weight loss     Preferred Pharmacy:    [facility]/pharmacy #[id number] - [facility], [place] - [address]  [address]  [facility] [address]  Phone: [phone] Fax: [phone]    [facility] - [place], [place] - [address] [facility]  [address] [facility] [address]  Phone: [phone] Fax: [phone]      Subjective    Accidentally removed IUD a \"couple of months ago\" - started progesterone while out, getting a new one placed tomorrow.     While IUD was in, no bleeding.   When IUD came out, returned to having a cycle following 14 days of progesterone.    -Heavy with clots, cramps    Any plans for a pregnancy in the next year: No    Uterus: Yes    Movement:   Joint pain? No- has been on estradiol x ~1 year  Strength training? Jazzercise - weights component to each hour long class, 40% cardio and 20% free weights.   Recommended listening to podcast with Alina White  Recommend 2-3x/week for 20 minutes  Osteopenia or osteoporosis: No    Stress Level (rate 1-10): 7-8 out of 10   7 yo daughter with Autism   Exercise is a stress reliever  Started with [facility] therapist - daughter + weight & body image  Recommend daily 2-5 minutes of mindfulness, meditation, journaling, etc to help reduce cortisol levels.     Depression? Yes, life long on sertraline    Sleep:   Quantity/Quality/Regularity? \"Not great\" - 5-6 hours nightly, wears fit bit sleep quality is \"fair\".   Daytime brain fog/memory troubles? Yes- estradiol helped, but still noticeable.   Tobacco? No  Alcohol? Yes. 1 glass of wine every couple day  Recommended reducing alcohol consumption  THC? No    Patient identified goals:       Onset:   3-4 years ago (COVID work from home)  Nothing else had changed, eating/moving the same, exercising more.   Overall weight gain about 50 " "lbs  Noticing ankles hurting more during jazzercise. 3-4 days/week in studio and 1-2 mornings at home.     Current challenges:   Mental - has been on and off diets entire life  Most consistent weight was 180  Adverse to tracking  Time  Feeling short on space for meal prep/planning    How long implementing diet/lifestyle change for weight loss:   Lifelong    Disordered eating patterns:   None    Weight Loss Drug Therapy Options Screening:     GLP-1 and Metformin:     Pre-Diabetes/Diabetes? No  Lab Results   Component Value Date    INSULFAST 15 08/06/2024    HGBA1C 5.2 08/06/2024       Pertinent PMH Review:   PMH of Pancreatitis: No  PMH of Gallbladder disease: No  PMH of Delayed Gastric Emptying: No  GI issues? GERD  Frequency of BM? Daily  PMH of MTC: No  PMH of Retinopathy: No  PMH of Suicidal Ideation: No    Topiramate:   Migraines? No    Adipex:   Anxiety? Yes    Thyroid health:   Lab Results   Component Value Date    TSH 1.24 12/20/2024        HRT  -Estradiol 1mg oral and IUD    Medications potentially contributing to weight gain:   None    Medications tried/stopped for weight management:    None     Concurrent Chronic Medical Conditions:   Cardiovascular Health  The 10-year ASCVD risk score (David PAYAN, et al., 2019) is: 0.4%    Values used to calculate the score:      Age: 44 years      Sex: Female      Is Non- : No      Diabetic: No      Tobacco smoker: No      Systolic Blood Pressure: 118 mmHg      Is BP treated: No      HDL Cholesterol: 67.8 mg/dL      Total Cholesterol: 174 mg/dL    Lab Results   Component Value Date    CHOL 174 08/06/2024     Lab Results   Component Value Date    HDL 67.8 08/06/2024     Lab Results   Component Value Date    LDLCALC 74 08/06/2024     Lab Results   Component Value Date    TRIG 163 (H) 08/06/2024     No components found for: \"CHOLHDL\"   BP Readings from Last 3 Encounters:   01/30/25 118/72   01/14/25 106/61   01/02/25 118/68      Blood Sugar " "Balance  Lab Results   Component Value Date    GLUCOSE 89 11/28/2024    HGBA1C 5.2 08/06/2024     Lab Results   Component Value Date    INSULFAST 15 08/06/2024         Thyroid  Lab Results   Component Value Date    TSH 1.24 12/20/2024     Iron Status  No results found for: \"IRON\", \"TIBC\", \"FERRITIN\"     Kidney Function  Lab Results   Component Value Date    GFRF >90 05/27/2023    CREATININE 0.92 11/28/2024       Potassium  Lab Results   Component Value Date    K 3.9 11/28/2024        Vitamin D3  Lab Results   Component Value Date    VITD25 39 08/06/2024   Recommended 2000 international units  daily, with a fat containing meal    Current Outpatient Medications on File Prior to Visit   Medication Sig Dispense Refill    cholecalciferol (Vitamin D-3) 25 MCG (1000 UT) capsule Take by mouth.      levothyroxine (Synthroid, Levoxyl) 50 mcg tablet Take 1 tablet (50 mcg) by mouth once daily in the morning. Take on empty stomach 30  to 60 minutes before first meal of the day 90 tablet 3    multivitamin tablet Take 1 tablet by mouth once daily.      sertraline (Zoloft) 100 mg tablet Take 1.5 tablets (150 mg) by mouth once daily. 135 tablet 3    tirzepatide, weight loss, (Zepbound) 7.5 mg/0.5 mL injection Inject 7.5 mg under the skin every 7 days. ICD-10 code: z68.35 4 each 11     No current facility-administered medications on file prior to visit.        Lifestyle and Nourishment Recommendations  Currently 3 meals daily with a mid morning and mid afternoon snack, sometimes after dinner snack.   Dinner at 8pm on gym days  Not gym days: 6:30pm  Bedtime: 9:30-10:30pm  Ideally last food 3 hours prior to bedtime.   Encouraged 1/2 of plate colorful vegetables at each meal  Focus on whole foods as close to nature as possible (less than 5 ingredients on the label)  Increase high quality protein to 25-30 grams per meal along with 2-3 x/week resistance training  Discussed [employer] Meats  Patient had questions regarding protein " "shakes, currently drinking [employer] protein shakes, recommended discontinuation. Will send recommendation for Whey Protein powder.   Include daily walking (150 min/week) and mind/body activities (meditation, mindfulness, journaling, etc.) for a minimum of 2-5 minutes daily to reduce cortisol levels.   Increase fiber 25-30 grams daily (work up slowly to avoid GI distress)  Goal: Daily bowel movement  Beverages: Focus on water, organic tea (loose leave or in paper, avoid plastic sachets), or sparkling/mineral water (ex. Spindrift, Emir)   Currently drinking 2-3 8pz cups daily. Last 3-4pm.   No more than 1 cup (8oz) of organic coffee daily prior to noon. May consider organic green tea.   Avoid alcohol   Avoid ALL artificial sweeteners   Prioritize 7-8 hours of restful sleep nightly.   Turn off electronics 1 hour prior to bedtime, no electronics in the bedroom.  Establish a regular sleep/wake time (+/- 30 minutes)     Medication and allergy reconciliation completed     Drug Interactions   No significant drug interactions identified    Assessment/Plan   Patients goals:   \"To be less than 200 lbs\" - updated goal to 185 lbs.   Discussed patients history, current medical conditions, medications, as well as current diet and lifestyle in depth.   Patient has been making diet/lifestyle changes for: >10 years  BMI: 35  MARLENI IR: 3.4 (>2 indicates potential insulin resistance  Concurrent medical conditions: hypothyroidism.   Levothyroxine was increased to 50 mcg, tolerating well.   Discussed elevated MARLENI-IR  Iman took metformin x 30 days at 500mg XL and x 30 days at 1000mg XL and has not noticed much benefit, discontinued and switched to zepbound vials.   Zepbound   Reached initial goal - just under 200 lbs   Would like to stay at current dose, would like to loose 10-15 more pounds (185 lbs)  Didn't loose any more since last visit, maintaining at 198 lbs  Cravings have improved  Currently experiencing premenstrual cravings " for chocolate  Discussed adding magnesium prior to menses (also has headaches and cramps)  BM once daily, has been taking metamucil   Tolerating well   Continued heartburn, using zantac prn to resolve symptoms (2-3 x/week)  Happens more often at night/after dinner  Has protein bar on way home from work  Sometimes occurs after this  Recommend tracking food/activity around the time that symptoms occur, we will come up with a plan at next visit.   Considering further increasing dose to 10mg  Jazzercise using weights, recently increased weight that she's using (12 lbs) 3 days/week      CONTINUE  Zepbound 10mg once weekly (Saturday)    Follow-up: 6/17/25 2:20pm      Time spent with pt: Total length of time 20 (minutes) of the encounter and more than 50% was spent counseling the patient.    Carmina Elizalde, Pharm.D, FA[facility], [facility]  Clinical Pharmacist  Pharmacy Services  [phone]    Continue all meds under the continuation of care with the referring provider and clinical pharmacy team.    Verbal consent to manage patient's drug therapy was obtained from the patient and/or an individual authorized to act on behalf of a patient. They were informed they may decline to participate or withdraw from participation in pharmacy services at any time.

## 2025-05-13 ENCOUNTER — APPOINTMENT (OUTPATIENT)
Dept: PHARMACY | Facility: HOSPITAL | Age: 45
End: 2025-05-13
Payer: COMMERCIAL

## 2025-05-13 DIAGNOSIS — R63.4 WEIGHT LOSS: ICD-10-CM

## 2025-05-13 DIAGNOSIS — K21.00 GASTROESOPHAGEAL REFLUX DISEASE WITH ESOPHAGITIS, UNSPECIFIED WHETHER HEMORRHAGE: ICD-10-CM

## 2025-05-13 DIAGNOSIS — E03.9 HYPOTHYROIDISM, UNSPECIFIED TYPE: ICD-10-CM

## 2025-05-13 RX ORDER — TIRZEPATIDE 10 MG/.5ML
10 INJECTION, SOLUTION SUBCUTANEOUS
Qty: 2 ML | Refills: 11 | Status: SHIPPED | OUTPATIENT
Start: 2025-05-13

## 2025-06-16 NOTE — PROGRESS NOTES
"  Patient ID: Iman Wooten is a 44 y.o. female who presents for No chief complaint on file..    Referring Provider: Francisco Javier Cope  Pt was referred for weight loss     Preferred Pharmacy:    [facility] #[id number] - [facility], [place] - [address]  [address]  [facility] [address]  Phone: [phone] Fax: [phone]    [facility] - [place], [place] - [address]  [address] [address]  Phone: [phone] Fax: [phone]      Subjective    Accidentally removed IUD a \"couple of months ago\" - started progesterone while out, getting a new one placed tomorrow.     While IUD was in, no bleeding.   When IUD came out, returned to having a cycle following 14 days of progesterone.    -Heavy with clots, cramps    Any plans for a pregnancy in the next year: No    Uterus: Yes    Movement:   Joint pain? No- has been on estradiol x ~1 year  Strength training? Jazzercise - weights component to each hour long class, 40% cardio and 20% free weights.   Recommended listening to podcast with Alina White  Recommend 2-3x/week for 20 minutes  Osteopenia or osteoporosis: No    Stress Level (rate 1-10): 7-8 out of 10   9 yo daughter with Autism   Exercise is a stress reliever  Started with [facility] therapist - daughter + weight & body image  Recommend daily 2-5 minutes of mindfulness, meditation, journaling, etc to help reduce cortisol levels.     Depression? Yes, life long on sertraline    Sleep:   Quantity/Quality/Regularity? \"Not great\" - 5-6 hours nightly, wears fit bit sleep quality is \"fair\".   Daytime brain fog/memory troubles? Yes- estradiol helped, but still noticeable.   Tobacco? No  Alcohol? Yes. 1 glass of wine every couple day  Recommended reducing alcohol consumption  THC? No    Patient identified goals:       Onset:   3-4 years ago (COVID work from home)  Nothing else had changed, eating/moving the same, exercising more.   Overall weight gain about 50 " "lbs  Noticing ankles hurting more during jazzercise. 3-4 days/week in studio and 1-2 mornings at home.     Current challenges:   Mental - has been on and off diets entire life  Most consistent weight was 180  Adverse to tracking  Time  Feeling short on space for meal prep/planning    How long implementing diet/lifestyle change for weight loss:   Lifelong    Disordered eating patterns:   None    Weight Loss Drug Therapy Options Screening:     GLP-1 and Metformin:     Pre-Diabetes/Diabetes? No  Lab Results   Component Value Date    INSULFAST 15 08/06/2024    HGBA1C 5.2 08/06/2024       Pertinent PMH Review:   PMH of Pancreatitis: No  PMH of Gallbladder disease: No  PMH of Delayed Gastric Emptying: No  GI issues? GERD  Frequency of BM? Daily  PMH of MTC: No  PMH of Retinopathy: No  PMH of Suicidal Ideation: No    Topiramate:   Migraines? No    Adipex:   Anxiety? Yes    Thyroid health:   Lab Results   Component Value Date    TSH 1.24 12/20/2024        HRT  -Estradiol 1mg oral and IUD    Medications potentially contributing to weight gain:   None    Medications tried/stopped for weight management:    None     Concurrent Chronic Medical Conditions:   Cardiovascular Health  The 10-year ASCVD risk score (David PAYAN, et al., 2019) is: 0.4%    Values used to calculate the score:      Age: 44 years      Sex: Female      Is Non- : No      Diabetic: No      Tobacco smoker: No      Systolic Blood Pressure: 118 mmHg      Is BP treated: No      HDL Cholesterol: 67.8 mg/dL      Total Cholesterol: 174 mg/dL    Lab Results   Component Value Date    CHOL 174 08/06/2024     Lab Results   Component Value Date    HDL 67.8 08/06/2024     Lab Results   Component Value Date    LDLCALC 74 08/06/2024     Lab Results   Component Value Date    TRIG 163 (H) 08/06/2024     No components found for: \"CHOLHDL\"   BP Readings from Last 3 Encounters:   01/30/25 118/72   01/14/25 106/61   01/02/25 118/68      Blood Sugar " "Balance  Lab Results   Component Value Date    GLUCOSE 89 11/28/2024    HGBA1C 5.2 08/06/2024     Lab Results   Component Value Date    INSULFAST 15 08/06/2024         Thyroid  Lab Results   Component Value Date    TSH 1.24 12/20/2024     Iron Status  No results found for: \"IRON\", \"TIBC\", \"FERRITIN\"     Kidney Function  Lab Results   Component Value Date    GFRF >90 05/27/2023    CREATININE 0.92 11/28/2024       Potassium  Lab Results   Component Value Date    K 3.9 11/28/2024        Vitamin D3  Lab Results   Component Value Date    VITD25 39 08/06/2024   Recommended 2000 international units  daily, with a fat containing meal    Current Outpatient Medications on File Prior to Visit   Medication Sig Dispense Refill    cholecalciferol (Vitamin D-3) 25 MCG (1000 UT) capsule Take by mouth.      levothyroxine (Synthroid, Levoxyl) 50 mcg tablet Take 1 tablet (50 mcg) by mouth once daily in the morning. Take on empty stomach 30  to 60 minutes before first meal of the day 90 tablet 3    multivitamin tablet Take 1 tablet by mouth once daily.      sertraline (Zoloft) 100 mg tablet Take 1.5 tablets (150 mg) by mouth once daily. 135 tablet 3    tirzepatide, weight loss, (Zepbound) 10 mg/0.5 mL solution Inject 10 mg under the skin every 7 days. ICD-10 code: z68.35 2 mL 11     No current facility-administered medications on file prior to visit.        Lifestyle and Nourishment Recommendations  Currently 3 meals daily with a mid morning and mid afternoon snack, sometimes after dinner snack.   Dinner at 8pm on gym days  Not gym days: 6:30pm  Bedtime: 9:30-10:30pm  Ideally last food 3 hours prior to bedtime.   Encouraged 1/2 of plate colorful vegetables at each meal  Focus on whole foods as close to nature as possible (less than 5 ingredients on the label)  Increase high quality protein to 25-30 grams per meal along with 2-3 x/week resistance training  Discussed [employer]  Patient had questions regarding protein shakes, " "currently drinking [employer] protein shakes, recommended discontinuation. Will send recommendation for Whey Protein powder.   Include daily walking (150 min/week) and mind/body activities (meditation, mindfulness, journaling, etc.) for a minimum of 2-5 minutes daily to reduce cortisol levels.   Increase fiber 25-30 grams daily (work up slowly to avoid GI distress)  Goal: Daily bowel movement  Beverages: Focus on water, organic tea (loose leave or in paper, avoid plastic sachets), or sparkling/mineral water (ex. [employer], Emir)   Currently drinking 2-3 8pz cups daily. Last 3-4pm.   No more than 1 cup (8oz) of organic coffee daily prior to noon. May consider organic green tea.   Avoid alcohol   Avoid ALL artificial sweeteners   Prioritize 7-8 hours of restful sleep nightly.   Turn off electronics 1 hour prior to bedtime, no electronics in the bedroom.  Establish a regular sleep/wake time (+/- 30 minutes)     Medication and allergy reconciliation completed     Drug Interactions   No significant drug interactions identified    Assessment/Plan   Patients goals:   \"To be less than 200 lbs\" - updated goal to 185 lbs.   Discussed patients history, current medical conditions, medications, as well as current diet and lifestyle in depth.   Patient has been making diet/lifestyle changes for: >10 years  BMI: 35  MARLENI IR: 3.4 (>2 indicates potential insulin resistance  Concurrent medical conditions: hypothyroidism.   Levothyroxine was increased to 50 mcg, tolerating well.   Discussed elevated MARLENI-IR  Iman took metformin x 30 days at 500mg XL and x 30 days at 1000mg XL and has not noticed much benefit, discontinued and switched to zepbound vials.   Zepbound   Experiencing more appetite supression on 10mg, but hasn't lost any more weight has been +/- 5 lbs, between 190-195 lbs.   Would like to stay at current dose, would like to loose 10-15 more pounds (185 lbs)  Working with intuitive eating, listening to hunger/fullness " signals   Breakfast smoothies from [employer] (12-18g of vegan protein)  Lunch - veggies, guac, ham and melon  Dinner- variable  Continued heartburn, using zantac prn to resolve symptoms (2-3 x/week)  Happens more often at night/after dinner  Has protein bar on way home from work  Sometimes occurs after this  Recommend tracking food/activity around the time that symptoms occur, we will come up with a plan at next visit.   Jazzercise using weights, recently increased weight that she's using (12 lbs) 3 days/week, on these days over 10k/steps. Non-jazzercise days are between 5-7k steps.   Recommend 7k/steps 7 days       CONTINUE  Zepbound 10mg once weekly (Saturday)    Follow-up: 8/12/25 2:20pm      Time spent with pt: Total length of time 20 (minutes) of the encounter and more than 50% was spent counseling the patient.    Carmina Elizalde, Pharm.D, [facility], [facility]  Clinical Pharmacist  Pharmacy Services  [phone]    Continue all meds under the continuation of care with the referring provider and clinical pharmacy team.    Verbal consent to manage patient's drug therapy was obtained from the patient and/or an individual authorized to act on behalf of a patient. They were informed they may decline to participate or withdraw from participation in pharmacy services at any time.

## 2025-06-17 ENCOUNTER — APPOINTMENT (OUTPATIENT)
Dept: PHARMACY | Facility: HOSPITAL | Age: 45
End: 2025-06-17
Payer: COMMERCIAL

## 2025-06-17 DIAGNOSIS — K21.00 GASTROESOPHAGEAL REFLUX DISEASE WITH ESOPHAGITIS, UNSPECIFIED WHETHER HEMORRHAGE: ICD-10-CM

## 2025-06-17 DIAGNOSIS — E03.9 HYPOTHYROIDISM, UNSPECIFIED TYPE: ICD-10-CM

## 2025-06-17 DIAGNOSIS — R63.4 WEIGHT LOSS: ICD-10-CM

## 2025-08-12 ENCOUNTER — APPOINTMENT (OUTPATIENT)
Dept: PHARMACY | Facility: HOSPITAL | Age: 45
End: 2025-08-12
Payer: COMMERCIAL

## 2025-08-12 DIAGNOSIS — K21.00 GASTROESOPHAGEAL REFLUX DISEASE WITH ESOPHAGITIS, UNSPECIFIED WHETHER HEMORRHAGE: ICD-10-CM

## 2025-08-12 DIAGNOSIS — R63.4 WEIGHT LOSS: ICD-10-CM

## 2025-08-12 DIAGNOSIS — E03.9 HYPOTHYROIDISM, UNSPECIFIED TYPE: ICD-10-CM

## 2025-08-28 ENCOUNTER — TELEPHONE (OUTPATIENT)
Dept: PHARMACY | Facility: HOSPITAL | Age: 45
End: 2025-08-28
Payer: COMMERCIAL

## 2025-08-28 DIAGNOSIS — E03.9 HYPOTHYROIDISM, UNSPECIFIED TYPE: ICD-10-CM

## 2025-08-28 DIAGNOSIS — K21.00 GASTROESOPHAGEAL REFLUX DISEASE WITH ESOPHAGITIS, UNSPECIFIED WHETHER HEMORRHAGE: ICD-10-CM

## 2025-08-28 RX ORDER — TIRZEPATIDE 12.5 MG/.5ML
12.5 INJECTION, SOLUTION SUBCUTANEOUS
Qty: 2 ML | Refills: 6 | Status: SHIPPED | OUTPATIENT
Start: 2025-08-28

## 2025-09-23 ENCOUNTER — APPOINTMENT (OUTPATIENT)
Dept: PHARMACY | Facility: HOSPITAL | Age: 45
End: 2025-09-23
Payer: COMMERCIAL

## (undated) DEVICE — DRESSING, ISLAND, TELFA, 4 X 5 IN

## (undated) DEVICE — NEEDLE, SPINAL, 20 G X 3.5 IN, YELLOW HUB

## (undated) DEVICE — ACCESSORY, FLUID MANAGEMENT, AVETA

## (undated) DEVICE — COVER HANDLE LIGHT, STERIS, BLUE, STERILE

## (undated) DEVICE — PREP TRAY, VAGINAL

## (undated) DEVICE — ACCESSORY, AVETA, WASTE MANAGEMENT WITH CAP

## (undated) DEVICE — Device

## (undated) DEVICE — GLOVE, SURGICAL, PROTEXIS PI BLUE W/NEUTHERA, 6.5, PF, LF

## (undated) DEVICE — SPONGE, GAUZE, XRAY DECT, 16 PLY, 4 X 4, W/MASTER DMT,STERILE

## (undated) DEVICE — DEVICE, RESECTING, AVETA WAVE, 3.9MM

## (undated) DEVICE — PAD, SANITARY, OBSTETRICAL, W/ADHSV STRIP,11 IN,LF

## (undated) DEVICE — GLOVE, SURGICAL, PROTEXIS PI , 6.5, PF, LF

## (undated) DEVICE — ACCESSORY, AVETA, WASTE MANAGEMENT